# Patient Record
Sex: FEMALE | Race: WHITE | Employment: FULL TIME | URBAN - METROPOLITAN AREA
[De-identification: names, ages, dates, MRNs, and addresses within clinical notes are randomized per-mention and may not be internally consistent; named-entity substitution may affect disease eponyms.]

---

## 2017-06-13 ENCOUNTER — HOSPITAL ENCOUNTER (OUTPATIENT)
Dept: PREADMISSION TESTING | Age: 62
Discharge: HOME | End: 2017-06-13
Payer: COMMERCIAL

## 2017-06-13 VITALS
BODY MASS INDEX: 50.19 KG/M2 | OXYGEN SATURATION: 100 % | WEIGHT: 283.25 LBS | RESPIRATION RATE: 16 BRPM | DIASTOLIC BLOOD PRESSURE: 71 MMHG | SYSTOLIC BLOOD PRESSURE: 117 MMHG | TEMPERATURE: 97.4 F | HEIGHT: 63 IN | HEART RATE: 61 BPM

## 2017-06-13 LAB
ANION GAP SERPL CALCULATED.3IONS-SCNC: 18 MMOL/L (ref 9–17)
BUN BLDV-MCNC: 28 MG/DL (ref 8–23)
CHLORIDE BLD-SCNC: 102 MMOL/L (ref 98–107)
CO2: 23 MMOL/L (ref 20–31)
CREAT SERPL-MCNC: 0.79 MG/DL (ref 0.5–0.9)
GFR AFRICAN AMERICAN: >60 ML/MIN
GFR NON-AFRICAN AMERICAN: >60 ML/MIN
GFR SERPL CREATININE-BSD FRML MDRD: ABNORMAL ML/MIN/{1.73_M2}
GFR SERPL CREATININE-BSD FRML MDRD: ABNORMAL ML/MIN/{1.73_M2}
GLUCOSE BLD-MCNC: 90 MG/DL (ref 70–99)
HCT VFR BLD CALC: 43.8 % (ref 36–46)
HEMOGLOBIN: 14.5 G/DL (ref 12–16)
MCH RBC QN AUTO: 30.6 PG (ref 26–34)
MCHC RBC AUTO-ENTMCNC: 33.1 G/DL (ref 31–37)
MCV RBC AUTO: 92.6 FL (ref 80–100)
PDW BLD-RTO: 14.2 % (ref 12.5–15.4)
PLATELET # BLD: 171 K/UL (ref 140–450)
PMV BLD AUTO: 8.1 FL (ref 6–12)
POTASSIUM SERPL-SCNC: 4.6 MMOL/L (ref 3.7–5.3)
RBC # BLD: 4.73 M/UL (ref 4–5.2)
SODIUM BLD-SCNC: 143 MMOL/L (ref 135–144)
WBC # BLD: 3.6 K/UL (ref 3.5–11)

## 2017-06-13 PROCEDURE — 84520 ASSAY OF UREA NITROGEN: CPT

## 2017-06-13 PROCEDURE — 93005 ELECTROCARDIOGRAM TRACING: CPT

## 2017-06-13 PROCEDURE — 82565 ASSAY OF CREATININE: CPT

## 2017-06-13 PROCEDURE — 80051 ELECTROLYTE PANEL: CPT

## 2017-06-13 PROCEDURE — 36415 COLL VENOUS BLD VENIPUNCTURE: CPT

## 2017-06-13 PROCEDURE — 82947 ASSAY GLUCOSE BLOOD QUANT: CPT

## 2017-06-13 PROCEDURE — 85027 COMPLETE CBC AUTOMATED: CPT

## 2017-06-13 RX ORDER — METOPROLOL SUCCINATE 100 MG/1
1 TABLET, EXTENDED RELEASE ORAL 2 TIMES DAILY
COMMUNITY

## 2017-06-13 RX ORDER — SODIUM CHLORIDE, SODIUM LACTATE, POTASSIUM CHLORIDE, CALCIUM CHLORIDE 600; 310; 30; 20 MG/100ML; MG/100ML; MG/100ML; MG/100ML
1000 INJECTION, SOLUTION INTRAVENOUS CONTINUOUS
Status: CANCELLED | OUTPATIENT
Start: 2017-06-13

## 2017-06-14 LAB
EKG ATRIAL RATE: 58 BPM
EKG P AXIS: 39 DEGREES
EKG P-R INTERVAL: 206 MS
EKG Q-T INTERVAL: 440 MS
EKG QRS DURATION: 102 MS
EKG QTC CALCULATION (BAZETT): 431 MS
EKG R AXIS: 65 DEGREES
EKG T AXIS: 99 DEGREES
EKG VENTRICULAR RATE: 58 BPM

## 2017-06-14 RX ORDER — OXYMETAZOLINE HYDROCHLORIDE 0.05 G/100ML
2 SPRAY NASAL EVERY 10 MIN PRN
Status: CANCELLED | OUTPATIENT
Start: 2017-06-14

## 2017-06-23 ENCOUNTER — ANESTHESIA (OUTPATIENT)
Dept: OPERATING ROOM | Age: 62
End: 2017-06-23
Payer: COMMERCIAL

## 2017-06-23 ENCOUNTER — ANESTHESIA EVENT (OUTPATIENT)
Dept: OPERATING ROOM | Age: 62
End: 2017-06-23
Payer: COMMERCIAL

## 2017-06-23 ENCOUNTER — HOSPITAL ENCOUNTER (OUTPATIENT)
Age: 62
Setting detail: OUTPATIENT SURGERY
Discharge: HOME | End: 2017-06-23
Attending: OTOLARYNGOLOGY | Admitting: OTOLARYNGOLOGY
Payer: COMMERCIAL

## 2017-06-23 VITALS
SYSTOLIC BLOOD PRESSURE: 115 MMHG | RESPIRATION RATE: 16 BRPM | HEART RATE: 61 BPM | TEMPERATURE: 97.9 F | HEIGHT: 63 IN | BODY MASS INDEX: 50.14 KG/M2 | WEIGHT: 283 LBS | DIASTOLIC BLOOD PRESSURE: 72 MMHG | OXYGEN SATURATION: 99 %

## 2017-06-23 VITALS — DIASTOLIC BLOOD PRESSURE: 111 MMHG | OXYGEN SATURATION: 100 % | TEMPERATURE: 96.6 F | SYSTOLIC BLOOD PRESSURE: 181 MMHG

## 2017-06-23 LAB
ALLEN TEST: NORMAL
ANION GAP: 8 MMOL/L (ref 7–16)
FIO2: NORMAL
GFR NON-AFRICAN AMERICAN: 54 ML/MIN
GFR SERPL CREATININE-BSD FRML MDRD: >60 ML/MIN
GFR SERPL CREATININE-BSD FRML MDRD: ABNORMAL ML/MIN/{1.73_M2}
GLUCOSE BLD-MCNC: 101 MG/DL (ref 74–100)
HCO3 VENOUS: 27.3 MMOL/L (ref 22–29)
MODE: NORMAL
NEGATIVE BASE EXCESS, VEN: NORMAL (ref 0–2)
O2 DEVICE/FLOW/%: NORMAL
O2 SAT, VEN: 82 % (ref 60–85)
PATIENT TEMP: NORMAL
PCO2, VEN: 44.6 MM HG (ref 41–51)
PH VENOUS: 7.39 (ref 7.32–7.43)
PO2, VEN: 46.9 MM HG (ref 30–50)
POC CHLORIDE: 106 MMOL/L (ref 98–107)
POC CREATININE: 1.04 MG/DL (ref 0.51–1.19)
POC HEMATOCRIT: 48 % (ref 36–46)
POC HEMOGLOBIN: 16.4 G/DL (ref 12–16)
POC IONIZED CALCIUM: 1.23 MMOL/L (ref 1.15–1.33)
POC LACTIC ACID: 0.88 MMOL/L (ref 0.56–1.39)
POC PCO2 TEMP: NORMAL MM HG
POC PH TEMP: NORMAL
POC PO2 TEMP: NORMAL MM HG
POC POTASSIUM: 4.6 MMOL/L (ref 3.5–4.5)
POC SODIUM: 141 MMOL/L (ref 138–146)
POSITIVE BASE EXCESS, VEN: 2 (ref 0–3)
SAMPLE SITE: NORMAL
TOTAL CO2, VENOUS: 29 MMOL/L (ref 23–30)

## 2017-06-23 PROCEDURE — 82947 ASSAY GLUCOSE BLOOD QUANT: CPT

## 2017-06-23 PROCEDURE — 3700000001 HC ADD 15 MINUTES (ANESTHESIA): Performed by: OTOLARYNGOLOGY

## 2017-06-23 PROCEDURE — 3600000014 HC SURGERY LEVEL 4 ADDTL 15MIN: Performed by: OTOLARYNGOLOGY

## 2017-06-23 PROCEDURE — 2580000003 HC RX 258: Performed by: OTOLARYNGOLOGY

## 2017-06-23 PROCEDURE — 84295 ASSAY OF SERUM SODIUM: CPT

## 2017-06-23 PROCEDURE — 2500000003 HC RX 250 WO HCPCS: Performed by: OTOLARYNGOLOGY

## 2017-06-23 PROCEDURE — 3700000000 HC ANESTHESIA ATTENDED CARE: Performed by: OTOLARYNGOLOGY

## 2017-06-23 PROCEDURE — 6360000002 HC RX W HCPCS: Performed by: ANESTHESIOLOGY

## 2017-06-23 PROCEDURE — 82330 ASSAY OF CALCIUM: CPT

## 2017-06-23 PROCEDURE — 2580000003 HC RX 258: Performed by: NURSE ANESTHETIST, CERTIFIED REGISTERED

## 2017-06-23 PROCEDURE — 82803 BLOOD GASES ANY COMBINATION: CPT

## 2017-06-23 PROCEDURE — 6370000000 HC RX 637 (ALT 250 FOR IP): Performed by: OTOLARYNGOLOGY

## 2017-06-23 PROCEDURE — 7100000001 HC PACU RECOVERY - ADDTL 15 MIN: Performed by: OTOLARYNGOLOGY

## 2017-06-23 PROCEDURE — 2580000003 HC RX 258: Performed by: ANESTHESIOLOGY

## 2017-06-23 PROCEDURE — 84132 ASSAY OF SERUM POTASSIUM: CPT

## 2017-06-23 PROCEDURE — 2500000003 HC RX 250 WO HCPCS: Performed by: NURSE ANESTHETIST, CERTIFIED REGISTERED

## 2017-06-23 PROCEDURE — 3600000004 HC SURGERY LEVEL 4 BASE: Performed by: OTOLARYNGOLOGY

## 2017-06-23 PROCEDURE — 82565 ASSAY OF CREATININE: CPT

## 2017-06-23 PROCEDURE — 7100000011 HC PHASE II RECOVERY - ADDTL 15 MIN: Performed by: OTOLARYNGOLOGY

## 2017-06-23 PROCEDURE — 83605 ASSAY OF LACTIC ACID: CPT

## 2017-06-23 PROCEDURE — 6360000002 HC RX W HCPCS: Performed by: NURSE ANESTHETIST, CERTIFIED REGISTERED

## 2017-06-23 PROCEDURE — 82435 ASSAY OF BLOOD CHLORIDE: CPT

## 2017-06-23 PROCEDURE — 7100000000 HC PACU RECOVERY - FIRST 15 MIN: Performed by: OTOLARYNGOLOGY

## 2017-06-23 PROCEDURE — 7100000010 HC PHASE II RECOVERY - FIRST 15 MIN: Performed by: OTOLARYNGOLOGY

## 2017-06-23 PROCEDURE — 85014 HEMATOCRIT: CPT

## 2017-06-23 RX ORDER — HYDRALAZINE HYDROCHLORIDE 20 MG/ML
5 INJECTION INTRAMUSCULAR; INTRAVENOUS EVERY 10 MIN PRN
Status: DISCONTINUED | OUTPATIENT
Start: 2017-06-23 | End: 2017-06-23 | Stop reason: HOSPADM

## 2017-06-23 RX ORDER — OXYCODONE HYDROCHLORIDE AND ACETAMINOPHEN 5; 325 MG/1; MG/1
1 TABLET ORAL PRN
Status: DISCONTINUED | OUTPATIENT
Start: 2017-06-23 | End: 2017-06-23 | Stop reason: HOSPADM

## 2017-06-23 RX ORDER — ONDANSETRON 2 MG/ML
INJECTION INTRAMUSCULAR; INTRAVENOUS PRN
Status: DISCONTINUED | OUTPATIENT
Start: 2017-06-23 | End: 2017-06-23 | Stop reason: SDUPTHER

## 2017-06-23 RX ORDER — ROCURONIUM BROMIDE 10 MG/ML
INJECTION, SOLUTION INTRAVENOUS PRN
Status: DISCONTINUED | OUTPATIENT
Start: 2017-06-23 | End: 2017-06-23 | Stop reason: SDUPTHER

## 2017-06-23 RX ORDER — LIDOCAINE HYDROCHLORIDE AND EPINEPHRINE 10; 10 MG/ML; UG/ML
INJECTION, SOLUTION INFILTRATION; PERINEURAL PRN
Status: DISCONTINUED | OUTPATIENT
Start: 2017-06-23 | End: 2017-06-23 | Stop reason: HOSPADM

## 2017-06-23 RX ORDER — LABETALOL HYDROCHLORIDE 5 MG/ML
5 INJECTION, SOLUTION INTRAVENOUS EVERY 10 MIN PRN
Status: DISCONTINUED | OUTPATIENT
Start: 2017-06-23 | End: 2017-06-23 | Stop reason: HOSPADM

## 2017-06-23 RX ORDER — OXYMETAZOLINE HYDROCHLORIDE 0.05 G/100ML
2 SPRAY NASAL EVERY 10 MIN PRN
Status: COMPLETED | OUTPATIENT
Start: 2017-06-23 | End: 2017-06-23

## 2017-06-23 RX ORDER — FENTANYL CITRATE 50 UG/ML
INJECTION, SOLUTION INTRAMUSCULAR; INTRAVENOUS PRN
Status: DISCONTINUED | OUTPATIENT
Start: 2017-06-23 | End: 2017-06-23 | Stop reason: SDUPTHER

## 2017-06-23 RX ORDER — AMOXICILLIN 500 MG/1
500 CAPSULE ORAL 3 TIMES DAILY
Qty: 30 CAPSULE | Refills: 0 | Status: SHIPPED | OUTPATIENT
Start: 2017-06-23 | End: 2017-07-03

## 2017-06-23 RX ORDER — SODIUM CHLORIDE, SODIUM LACTATE, POTASSIUM CHLORIDE, CALCIUM CHLORIDE 600; 310; 30; 20 MG/100ML; MG/100ML; MG/100ML; MG/100ML
1000 INJECTION, SOLUTION INTRAVENOUS CONTINUOUS
Status: DISCONTINUED | OUTPATIENT
Start: 2017-06-23 | End: 2017-06-23 | Stop reason: HOSPADM

## 2017-06-23 RX ORDER — FENTANYL CITRATE 50 UG/ML
50 INJECTION, SOLUTION INTRAMUSCULAR; INTRAVENOUS EVERY 5 MIN PRN
Status: DISCONTINUED | OUTPATIENT
Start: 2017-06-23 | End: 2017-06-23 | Stop reason: HOSPADM

## 2017-06-23 RX ORDER — OXYCODONE HYDROCHLORIDE AND ACETAMINOPHEN 5; 325 MG/1; MG/1
1 TABLET ORAL EVERY 6 HOURS PRN
Qty: 30 TABLET | Refills: 0 | Status: SHIPPED | OUTPATIENT
Start: 2017-06-23 | End: 2017-06-30

## 2017-06-23 RX ORDER — DEXAMETHASONE SODIUM PHOSPHATE 10 MG/ML
INJECTION INTRAMUSCULAR; INTRAVENOUS PRN
Status: DISCONTINUED | OUTPATIENT
Start: 2017-06-23 | End: 2017-06-23 | Stop reason: SDUPTHER

## 2017-06-23 RX ORDER — SODIUM CHLORIDE, SODIUM LACTATE, POTASSIUM CHLORIDE, CALCIUM CHLORIDE 600; 310; 30; 20 MG/100ML; MG/100ML; MG/100ML; MG/100ML
INJECTION, SOLUTION INTRAVENOUS CONTINUOUS PRN
Status: DISCONTINUED | OUTPATIENT
Start: 2017-06-23 | End: 2017-06-23 | Stop reason: SDUPTHER

## 2017-06-23 RX ORDER — ONDANSETRON 2 MG/ML
4 INJECTION INTRAMUSCULAR; INTRAVENOUS
Status: DISCONTINUED | OUTPATIENT
Start: 2017-06-23 | End: 2017-06-23 | Stop reason: HOSPADM

## 2017-06-23 RX ORDER — OXYCODONE HYDROCHLORIDE AND ACETAMINOPHEN 5; 325 MG/1; MG/1
2 TABLET ORAL PRN
Status: DISCONTINUED | OUTPATIENT
Start: 2017-06-23 | End: 2017-06-23 | Stop reason: HOSPADM

## 2017-06-23 RX ORDER — LIDOCAINE HYDROCHLORIDE 10 MG/ML
INJECTION, SOLUTION EPIDURAL; INFILTRATION; INTRACAUDAL; PERINEURAL PRN
Status: DISCONTINUED | OUTPATIENT
Start: 2017-06-23 | End: 2017-06-23 | Stop reason: SDUPTHER

## 2017-06-23 RX ORDER — MAGNESIUM HYDROXIDE 1200 MG/15ML
LIQUID ORAL CONTINUOUS PRN
Status: DISCONTINUED | OUTPATIENT
Start: 2017-06-23 | End: 2017-06-23 | Stop reason: HOSPADM

## 2017-06-23 RX ORDER — FENTANYL CITRATE 50 UG/ML
25 INJECTION, SOLUTION INTRAMUSCULAR; INTRAVENOUS EVERY 5 MIN PRN
Status: DISCONTINUED | OUTPATIENT
Start: 2017-06-23 | End: 2017-06-23 | Stop reason: HOSPADM

## 2017-06-23 RX ORDER — PROPOFOL 10 MG/ML
INJECTION, EMULSION INTRAVENOUS PRN
Status: DISCONTINUED | OUTPATIENT
Start: 2017-06-23 | End: 2017-06-23 | Stop reason: SDUPTHER

## 2017-06-23 RX ORDER — MORPHINE SULFATE 2 MG/ML
2 INJECTION, SOLUTION INTRAMUSCULAR; INTRAVENOUS EVERY 5 MIN PRN
Status: DISCONTINUED | OUTPATIENT
Start: 2017-06-23 | End: 2017-06-23 | Stop reason: HOSPADM

## 2017-06-23 RX ORDER — GLYCOPYRROLATE 0.2 MG/ML
INJECTION INTRAMUSCULAR; INTRAVENOUS PRN
Status: DISCONTINUED | OUTPATIENT
Start: 2017-06-23 | End: 2017-06-23 | Stop reason: SDUPTHER

## 2017-06-23 RX ORDER — MEPERIDINE HYDROCHLORIDE 50 MG/ML
12.5 INJECTION INTRAMUSCULAR; INTRAVENOUS; SUBCUTANEOUS EVERY 5 MIN PRN
Status: DISCONTINUED | OUTPATIENT
Start: 2017-06-23 | End: 2017-06-23 | Stop reason: HOSPADM

## 2017-06-23 RX ORDER — DIPHENHYDRAMINE HYDROCHLORIDE 50 MG/ML
12.5 INJECTION INTRAMUSCULAR; INTRAVENOUS
Status: DISCONTINUED | OUTPATIENT
Start: 2017-06-23 | End: 2017-06-23 | Stop reason: HOSPADM

## 2017-06-23 RX ADMIN — ONDANSETRON 4 MG: 2 INJECTION INTRAMUSCULAR; INTRAVENOUS at 08:01

## 2017-06-23 RX ADMIN — SODIUM CHLORIDE, POTASSIUM CHLORIDE, SODIUM LACTATE AND CALCIUM CHLORIDE: 600; 310; 30; 20 INJECTION, SOLUTION INTRAVENOUS at 07:30

## 2017-06-23 RX ADMIN — PROPOFOL 200 MG: 10 INJECTION, EMULSION INTRAVENOUS at 07:33

## 2017-06-23 RX ADMIN — Medication 3 G: at 07:46

## 2017-06-23 RX ADMIN — FENTANYL CITRATE 25 MCG: 50 INJECTION, SOLUTION INTRAMUSCULAR; INTRAVENOUS at 08:50

## 2017-06-23 RX ADMIN — DEXAMETHASONE SODIUM PHOSPHATE 10 MG: 10 INJECTION INTRAMUSCULAR; INTRAVENOUS at 07:42

## 2017-06-23 RX ADMIN — SODIUM CHLORIDE, POTASSIUM CHLORIDE, SODIUM LACTATE AND CALCIUM CHLORIDE 1000 ML: 600; 310; 30; 20 INJECTION, SOLUTION INTRAVENOUS at 06:45

## 2017-06-23 RX ADMIN — OXYMETAZOLINE HYDROCHLORIDE 2 SPRAY: 5 SPRAY NASAL at 07:00

## 2017-06-23 RX ADMIN — LIDOCAINE HYDROCHLORIDE 50 MG: 10 INJECTION, SOLUTION EPIDURAL; INFILTRATION; INTRACAUDAL; PERINEURAL at 07:33

## 2017-06-23 RX ADMIN — ROCURONIUM BROMIDE 30 MG: 10 INJECTION, SOLUTION INTRAVENOUS at 07:33

## 2017-06-23 RX ADMIN — GLYCOPYRROLATE 0.2 MG: 0.2 INJECTION INTRAMUSCULAR; INTRAVENOUS at 07:44

## 2017-06-23 RX ADMIN — FENTANYL CITRATE 50 MCG: 50 INJECTION INTRAMUSCULAR; INTRAVENOUS at 07:33

## 2017-06-23 RX ADMIN — OXYMETAZOLINE HYDROCHLORIDE 2 SPRAY: 5 SPRAY NASAL at 07:08

## 2017-06-23 ASSESSMENT — ENCOUNTER SYMPTOMS: SHORTNESS OF BREATH: 1

## 2017-06-23 ASSESSMENT — PAIN SCALES - GENERAL
PAINLEVEL_OUTOF10: 4
PAINLEVEL_OUTOF10: 2
PAINLEVEL_OUTOF10: 0
PAINLEVEL_OUTOF10: 2
PAINLEVEL_OUTOF10: 2
PAINLEVEL_OUTOF10: 0
PAINLEVEL_OUTOF10: 0

## 2017-06-23 ASSESSMENT — PAIN DESCRIPTION - PAIN TYPE: TYPE: SURGICAL PAIN

## 2017-06-23 ASSESSMENT — PAIN DESCRIPTION - DESCRIPTORS: DESCRIPTORS: BURNING

## 2017-06-23 ASSESSMENT — PAIN DESCRIPTION - LOCATION: LOCATION: NOSE

## 2017-06-23 ASSESSMENT — PAIN - FUNCTIONAL ASSESSMENT: PAIN_FUNCTIONAL_ASSESSMENT: 0-10

## 2017-08-03 ENCOUNTER — HOSPITAL ENCOUNTER (OUTPATIENT)
Age: 62
Setting detail: SPECIMEN
Discharge: HOME | End: 2017-08-03
Payer: COMMERCIAL

## 2017-08-03 LAB
ALBUMIN SERPL-MCNC: 4 G/DL (ref 3.5–5.2)
ALBUMIN/GLOBULIN RATIO: 1.2 (ref 1–2.5)
ALP BLD-CCNC: 60 U/L (ref 35–104)
ALT SERPL-CCNC: 19 U/L (ref 5–33)
ANION GAP SERPL CALCULATED.3IONS-SCNC: 15 MMOL/L (ref 9–17)
AST SERPL-CCNC: 22 U/L
BILIRUB SERPL-MCNC: 0.61 MG/DL (ref 0.3–1.2)
BUN BLDV-MCNC: 19 MG/DL (ref 8–23)
BUN/CREAT BLD: NORMAL (ref 9–20)
CALCIUM SERPL-MCNC: 9.5 MG/DL (ref 8.6–10.4)
CHLORIDE BLD-SCNC: 101 MMOL/L (ref 98–107)
CHOLESTEROL/HDL RATIO: 2.9
CHOLESTEROL: 140 MG/DL
CO2: 24 MMOL/L (ref 20–31)
CREAT SERPL-MCNC: 0.76 MG/DL (ref 0.5–0.9)
GFR AFRICAN AMERICAN: >60 ML/MIN
GFR NON-AFRICAN AMERICAN: >60 ML/MIN
GFR SERPL CREATININE-BSD FRML MDRD: NORMAL ML/MIN/{1.73_M2}
GFR SERPL CREATININE-BSD FRML MDRD: NORMAL ML/MIN/{1.73_M2}
GLUCOSE BLD-MCNC: 96 MG/DL (ref 70–99)
HCT VFR BLD CALC: 43.9 % (ref 36–46)
HDLC SERPL-MCNC: 48 MG/DL
HEMOGLOBIN: 14.7 G/DL (ref 12–16)
LDL CHOLESTEROL: 75 MG/DL (ref 0–130)
MCH RBC QN AUTO: 30.6 PG (ref 26–34)
MCHC RBC AUTO-ENTMCNC: 33.5 G/DL (ref 31–37)
MCV RBC AUTO: 91.4 FL (ref 80–100)
PDW BLD-RTO: 14.1 % (ref 12.5–15.4)
PLATELET # BLD: 184 K/UL (ref 140–450)
PMV BLD AUTO: 8.5 FL (ref 6–12)
POTASSIUM SERPL-SCNC: 4.8 MMOL/L (ref 3.7–5.3)
RBC # BLD: 4.8 M/UL (ref 4–5.2)
SODIUM BLD-SCNC: 140 MMOL/L (ref 135–144)
TOTAL PROTEIN: 7.4 G/DL (ref 6.4–8.3)
TRIGL SERPL-MCNC: 86 MG/DL
TSH SERPL DL<=0.05 MIU/L-ACNC: 2.79 MIU/L (ref 0.3–5)
VITAMIN D 25-HYDROXY: 41.2 NG/ML (ref 30–100)
VLDLC SERPL CALC-MCNC: NORMAL MG/DL (ref 1–30)
WBC # BLD: 3.5 K/UL (ref 3.5–11)

## 2017-11-09 ENCOUNTER — OFFICE VISIT (OUTPATIENT)
Dept: PODIATRY | Age: 62
End: 2017-11-09
Payer: COMMERCIAL

## 2017-11-09 VITALS — BODY MASS INDEX: 42.44 KG/M2 | HEIGHT: 68 IN | WEIGHT: 280 LBS

## 2017-11-09 DIAGNOSIS — D23.71 BENIGN NEOPLASM OF SKIN OF LOWER LIMB, INCLUDING HIP, RIGHT: ICD-10-CM

## 2017-11-09 DIAGNOSIS — M79.604 BILATERAL LOWER EXTREMITY PAIN: Primary | ICD-10-CM

## 2017-11-09 DIAGNOSIS — M79.605 BILATERAL LOWER EXTREMITY PAIN: Primary | ICD-10-CM

## 2017-11-09 DIAGNOSIS — B35.1 DERMATOPHYTOSIS OF NAIL: ICD-10-CM

## 2017-11-09 PROBLEM — R09.89 BRUIT OF RIGHT CAROTID ARTERY: Status: ACTIVE | Noted: 2017-02-11

## 2017-11-09 PROBLEM — I73.9 PAD (PERIPHERAL ARTERY DISEASE) (HCC): Status: ACTIVE | Noted: 2017-02-11

## 2017-11-09 PROCEDURE — 1036F TOBACCO NON-USER: CPT | Performed by: PODIATRIST

## 2017-11-09 PROCEDURE — G8484 FLU IMMUNIZE NO ADMIN: HCPCS | Performed by: PODIATRIST

## 2017-11-09 PROCEDURE — 11721 DEBRIDE NAIL 6 OR MORE: CPT | Performed by: PODIATRIST

## 2017-11-09 PROCEDURE — G8417 CALC BMI ABV UP PARAM F/U: HCPCS | Performed by: PODIATRIST

## 2017-11-09 PROCEDURE — 3014F SCREEN MAMMO DOC REV: CPT | Performed by: PODIATRIST

## 2017-11-09 PROCEDURE — G8427 DOCREV CUR MEDS BY ELIG CLIN: HCPCS | Performed by: PODIATRIST

## 2017-11-09 PROCEDURE — 17110 DESTRUCTION B9 LES UP TO 14: CPT | Performed by: PODIATRIST

## 2017-11-09 PROCEDURE — 3017F COLORECTAL CA SCREEN DOC REV: CPT | Performed by: PODIATRIST

## 2017-11-09 NOTE — PROGRESS NOTES
500 mg by mouth 2 times daily       potassium chloride SA (K-DUR;KLOR-CON M) 20 MEQ tablet Take 1 tablet by mouth daily. 60 tablet 3    furosemide (LASIX) 40 MG tablet Take 1 tablet by mouth 2 times daily. (Patient taking differently: Take 20 mg by mouth 2 times daily ) 60 tablet 3    Cholecalciferol (VITAMIN D3) 2000 UNITS CAPS Take 1 tablet by mouth daily.  FISH OIL Take 1,000 mg by mouth daily       COENZYME Q10 Take 50 mg by mouth daily       aspirin 81 MG EC tablet Take 81 mg by mouth daily STOPPED TAKING 06/02/2017 FOR SURGERY       No current facility-administered medications on file prior to visit. Review of Systems  Objective:  General: AAO x 3 in NAD. Derm  Toenail Description  Sites of Onychomycosis Involvement (Check affected area)  [x] [x] [x] [x] [x] [x] [x] [x] [x] [x]  5 4 3 2 1 1 2 3 4 5                          Right                                        Left    Thickness  [x] [x] [x] [x] [x] [x] [x] [x] [x] [x]  5 4 3 2 1 1 2 3 4 5                         Right                                        Left    Dystrophic Changes   [x] [x] [x] [x] [x] [x] [x] [x] [x] [x]  5 4 3 2 1 1 2 3 4 5                         Right                                        Left    Color  [x] [x] [x] [x] [x] [x] [x] [x] [x] [x]  5 4 3 2 1 1 2 3 4 5                          Right                                        Left    Incurvation/Ingrowin   [] [] [] [] [] [] [] [] [] []  5 4 3 2 1 1 2 3 4 5                         Right                                        Left    Inflammation/Pain   [x] [x] [x] [x] [x] [x] [x] [x] [x] [x]  5 4 3  2 1 1 2 3 4 5                         Right                                        Left       Nails are painful 1-10 with direct palpation. Dermatologic Exam:  Skin lesion present to the right plantar foot with a central core and petchaie noted to the right foot at the central core. .   Skin No rashes or nodules noted. .       Musculoskeletal:     1st MPJ

## 2017-12-05 ENCOUNTER — HOSPITAL ENCOUNTER (OUTPATIENT)
Dept: VASCULAR LAB | Age: 62
Discharge: HOME | End: 2017-12-05
Payer: COMMERCIAL

## 2017-12-05 PROCEDURE — 93971 EXTREMITY STUDY: CPT

## 2018-01-15 ENCOUNTER — HOSPITAL ENCOUNTER (OUTPATIENT)
Age: 63
Setting detail: SPECIMEN
Discharge: HOME | End: 2018-01-15
Payer: COMMERCIAL

## 2018-01-15 ENCOUNTER — OFFICE VISIT (OUTPATIENT)
Dept: OBGYN CLINIC | Age: 63
End: 2018-01-15
Payer: COMMERCIAL

## 2018-01-15 VITALS
HEIGHT: 68 IN | WEIGHT: 280 LBS | DIASTOLIC BLOOD PRESSURE: 70 MMHG | SYSTOLIC BLOOD PRESSURE: 120 MMHG | BODY MASS INDEX: 42.44 KG/M2

## 2018-01-15 DIAGNOSIS — Z01.419 ENCOUNTER FOR ROUTINE GYNECOLOGICAL EXAMINATION WITH PAPANICOLAOU SMEAR OF CERVIX: Primary | ICD-10-CM

## 2018-01-15 DIAGNOSIS — Z12.31 ENCOUNTER FOR SCREENING MAMMOGRAM FOR MALIGNANT NEOPLASM OF BREAST: ICD-10-CM

## 2018-01-15 PROCEDURE — 99396 PREV VISIT EST AGE 40-64: CPT | Performed by: OBSTETRICS & GYNECOLOGY

## 2018-01-15 ASSESSMENT — ENCOUNTER SYMPTOMS
COUGH: 0
NAUSEA: 0
VOMITING: 0
BLURRED VISION: 0
ABDOMINAL PAIN: 0
WHEEZING: 0
ORTHOPNEA: 0
CONSTIPATION: 0
DOUBLE VISION: 0
DIARRHEA: 0
HEARTBURN: 0

## 2018-01-15 NOTE — PROGRESS NOTES
potassium chloride SA (K-DUR;KLOR-CON M) 20 MEQ tablet Take 1 tablet by mouth daily. 60 tablet 3    furosemide (LASIX) 40 MG tablet Take 1 tablet by mouth 2 times daily. (Patient taking differently: Take 20 mg by mouth 2 times daily ) 60 tablet 3    Cholecalciferol (VITAMIN D3) 2000 UNITS CAPS Take 1 tablet by mouth daily.  FISH OIL Take 1,000 mg by mouth daily       COENZYME Q10 Take 50 mg by mouth daily       aspirin 81 MG EC tablet Take 81 mg by mouth daily STOPPED TAKING 2017 FOR SURGERY       No current facility-administered medications for this visit. Allergies:  Norvasc [amlodipine]; Lisinopril; Sulfa antibiotics; and Sulfasalazine    Gynecologic History:  No LMP recorded. Patient is postmenopausal.  Sexually Active: No  STD History: No      Obstetric History       T2      L2     SAB0   TAB0   Ectopic0   Molar0   Multiple0   Live Births2      ______________________________________________________________________  REVIEW OF SYSTEMS:        Review of Systems   Constitutional: Negative for chills, fever and weight loss. HENT: Negative for hearing loss. Eyes: Negative for blurred vision and double vision. Respiratory: Negative for cough and wheezing. Cardiovascular: Negative for chest pain, palpitations and orthopnea. Gastrointestinal: Negative for abdominal pain, constipation, diarrhea, heartburn, nausea and vomiting. Genitourinary: Negative for dysuria, frequency and urgency. Musculoskeletal: Negative for joint pain and myalgias. Skin: Negative for rash. Neurological: Negative for dizziness, tingling, focal weakness, weakness and headaches. Endo/Heme/Allergies: Does not bruise/bleed easily. Psychiatric/Behavioral: Negative for depression, substance abuse and suicidal ideas. The patient does not have insomnia.         /70   Ht 5' 8\" (1.727 m)   Wt 280 lb (127 kg)   BMI 42.57 kg/m²

## 2018-01-16 LAB
HPV SAMPLE: NORMAL
HPV SOURCE: NORMAL
HPV, GENOTYPE 16: NOT DETECTED
HPV, GENOTYPE 18: NOT DETECTED
HPV, HIGH RISK OTHER: NOT DETECTED
HPV, INTERPRETATION: NORMAL

## 2018-01-18 DIAGNOSIS — Z12.11 ENCOUNTER FOR SCREENING COLONOSCOPY: Primary | ICD-10-CM

## 2018-01-18 LAB
CONTROL: NORMAL
HEMOCCULT STL QL: NEGATIVE

## 2018-01-18 PROCEDURE — 82274 ASSAY TEST FOR BLOOD FECAL: CPT | Performed by: OBSTETRICS & GYNECOLOGY

## 2018-01-24 LAB — CYTOLOGY REPORT: NORMAL

## 2018-01-25 ENCOUNTER — OFFICE VISIT (OUTPATIENT)
Dept: PODIATRY | Age: 63
End: 2018-01-25
Payer: COMMERCIAL

## 2018-01-25 VITALS — WEIGHT: 280 LBS | BODY MASS INDEX: 42.44 KG/M2 | HEIGHT: 68 IN

## 2018-01-25 DIAGNOSIS — D23.71 BENIGN NEOPLASM OF SKIN OF LOWER LIMB, INCLUDING HIP, RIGHT: ICD-10-CM

## 2018-01-25 DIAGNOSIS — M79.605 BILATERAL LOWER EXTREMITY PAIN: Primary | ICD-10-CM

## 2018-01-25 DIAGNOSIS — M79.604 BILATERAL LOWER EXTREMITY PAIN: Primary | ICD-10-CM

## 2018-01-25 DIAGNOSIS — B35.1 DERMATOPHYTOSIS OF NAIL: ICD-10-CM

## 2018-01-25 PROCEDURE — 3014F SCREEN MAMMO DOC REV: CPT | Performed by: PODIATRIST

## 2018-01-25 PROCEDURE — 1036F TOBACCO NON-USER: CPT | Performed by: PODIATRIST

## 2018-01-25 PROCEDURE — 11721 DEBRIDE NAIL 6 OR MORE: CPT | Performed by: PODIATRIST

## 2018-01-25 PROCEDURE — G8427 DOCREV CUR MEDS BY ELIG CLIN: HCPCS | Performed by: PODIATRIST

## 2018-01-25 PROCEDURE — G8484 FLU IMMUNIZE NO ADMIN: HCPCS | Performed by: PODIATRIST

## 2018-01-25 PROCEDURE — 17110 DESTRUCTION B9 LES UP TO 14: CPT | Performed by: PODIATRIST

## 2018-01-25 PROCEDURE — G8417 CALC BMI ABV UP PARAM F/U: HCPCS | Performed by: PODIATRIST

## 2018-01-25 PROCEDURE — 3017F COLORECTAL CA SCREEN DOC REV: CPT | Performed by: PODIATRIST

## 2018-01-25 NOTE — PROGRESS NOTES
[x] [x] [x] [x] [x] [x] [x] [x] [x] [x]  5 4 3  2 1 1 2 3 4 5                         Right                                        Left       Nails are painful 1-10 with direct palpation. Dermatologic Exam:  Skin lesion/ulceration Absent . Skin No rashes or nodules noted. .       Musculoskeletal:     1st MPJ ROM decreased, Bilateral.  Muscle strength 5/5, Bilateral.  Pain present upon palpation of toenails 1-5, Bilateral. decreased medial longitudinal arch, Bilateral.  Ankle ROM decreased,Bilateral.    Dorsally contracted digits present digits 2, Bilateral.     Vascular: DP and PT pulses palpable 1/4, Bilateral.  CFT <5 seconds, Bilateral.  Hair growth absent to the level of the digits, Bilateral.  Edema present, Bilateral.  Varicosities absent, Bilateral. Erythema absent, Bilateral    Integument: Warm, dry, supple, Bilateral.  Open lesion absent, Bilateral.  Interdigital maceration absent to web spaces 4, Bilateral.  Nails 1-5 left and 1-5 right thickened > 3.0 mm, dystrophic and crumbly, discolored with subungual debris. Fissures absent, Bilateral.   Neurological:  Sensation present to light touch to level of digits, Bilateral.    Assessment:  65 y.o. female with:   1. Bilateral lower extremity pain  30427 - [place] DEBRIDEMENT OF NAILS, 6 OR MORE   2. Dermatophytosis of nail  41566 - [place] DEBRIDEMENT OF NAILS, 6 OR MORE   3. Benign neoplasm of skin of lower limb, including hip, right  90948 - [place] DESTRUCTION BENIGN LESIONS UP TO 14         Plan:   Pt was evaluated and examined. Patient was given personalized discharge instructions. Nails 1-10 were debrided in length and thickness sharply with a nail nipper and  without incident. Pt will follow up in 9 weeks or sooner if any problems arise. Diagnosis was discussed with the pt and all of their questions were answered in detail. Proper foot hygiene and care was discussed with the pt.  Patient to check feet daily and contact the office with any

## 2018-02-07 ENCOUNTER — HOSPITAL ENCOUNTER (OUTPATIENT)
Age: 63
Setting detail: SPECIMEN
Discharge: HOME | End: 2018-02-07
Payer: COMMERCIAL

## 2018-02-07 LAB
ABSOLUTE EOS #: 0.05 K/UL (ref 0–0.44)
ABSOLUTE IMMATURE GRANULOCYTE: <0.03 K/UL (ref 0–0.3)
ABSOLUTE LYMPH #: 1.63 K/UL (ref 1.1–3.7)
ABSOLUTE MONO #: 0.48 K/UL (ref 0.1–1.2)
ANION GAP SERPL CALCULATED.3IONS-SCNC: 11 MMOL/L (ref 9–17)
BASOPHILS # BLD: 1 % (ref 0–2)
BASOPHILS ABSOLUTE: <0.03 K/UL (ref 0–0.2)
BUN BLDV-MCNC: 27 MG/DL (ref 8–23)
BUN/CREAT BLD: ABNORMAL (ref 9–20)
CALCIUM SERPL-MCNC: 10.1 MG/DL (ref 8.6–10.4)
CHLORIDE BLD-SCNC: 92 MMOL/L (ref 98–107)
CO2: 29 MMOL/L (ref 20–31)
CREAT SERPL-MCNC: 0.96 MG/DL (ref 0.5–0.9)
DIFFERENTIAL TYPE: ABNORMAL
EOSINOPHILS RELATIVE PERCENT: 1 % (ref 1–4)
GFR AFRICAN AMERICAN: >60 ML/MIN
GFR NON-AFRICAN AMERICAN: 59 ML/MIN
GFR SERPL CREATININE-BSD FRML MDRD: ABNORMAL ML/MIN/{1.73_M2}
GFR SERPL CREATININE-BSD FRML MDRD: ABNORMAL ML/MIN/{1.73_M2}
GLUCOSE BLD-MCNC: 83 MG/DL (ref 70–99)
HCT VFR BLD CALC: 48.4 % (ref 36.3–47.1)
HEMOGLOBIN: 15.2 G/DL (ref 11.9–15.1)
IMMATURE GRANULOCYTES: 0 %
LYMPHOCYTES # BLD: 47 % (ref 24–43)
MCH RBC QN AUTO: 30.2 PG (ref 25.2–33.5)
MCHC RBC AUTO-ENTMCNC: 31.4 G/DL (ref 28.4–34.8)
MCV RBC AUTO: 96 FL (ref 82.6–102.9)
MONOCYTES # BLD: 14 % (ref 3–12)
NRBC AUTOMATED: 0 PER 100 WBC
PDW BLD-RTO: 12.8 % (ref 11.8–14.4)
PLATELET # BLD: 220 K/UL (ref 138–453)
PLATELET ESTIMATE: ABNORMAL
PMV BLD AUTO: 10.3 FL (ref 8.1–13.5)
POTASSIUM SERPL-SCNC: 5.5 MMOL/L (ref 3.7–5.3)
RBC # BLD: 5.04 M/UL (ref 3.95–5.11)
RBC # BLD: ABNORMAL 10*6/UL
SEG NEUTROPHILS: 37 % (ref 36–65)
SEGMENTED NEUTROPHILS ABSOLUTE COUNT: 1.28 K/UL (ref 1.5–8.1)
SODIUM BLD-SCNC: 132 MMOL/L (ref 135–144)
WBC # BLD: 3.5 K/UL (ref 3.5–11.3)
WBC # BLD: ABNORMAL 10*3/UL

## 2018-02-28 ENCOUNTER — OFFICE VISIT (OUTPATIENT)
Dept: PODIATRY | Age: 63
End: 2018-02-28
Payer: COMMERCIAL

## 2018-02-28 VITALS — RESPIRATION RATE: 16 BRPM | WEIGHT: 280 LBS | HEIGHT: 62 IN | HEART RATE: 72 BPM | BODY MASS INDEX: 51.53 KG/M2

## 2018-02-28 DIAGNOSIS — I73.9 PVD (PERIPHERAL VASCULAR DISEASE) (HCC): ICD-10-CM

## 2018-02-28 DIAGNOSIS — M79.671 PAIN IN RIGHT FOOT: ICD-10-CM

## 2018-02-28 DIAGNOSIS — D23.71 BENIGN NEOPLASM OF SKIN OF LOWER LIMB, INCLUDING HIP, RIGHT: Primary | ICD-10-CM

## 2018-02-28 PROCEDURE — G8427 DOCREV CUR MEDS BY ELIG CLIN: HCPCS | Performed by: PODIATRIST

## 2018-02-28 PROCEDURE — 3017F COLORECTAL CA SCREEN DOC REV: CPT | Performed by: PODIATRIST

## 2018-02-28 PROCEDURE — 99213 OFFICE O/P EST LOW 20 MIN: CPT | Performed by: PODIATRIST

## 2018-02-28 PROCEDURE — G8484 FLU IMMUNIZE NO ADMIN: HCPCS | Performed by: PODIATRIST

## 2018-02-28 PROCEDURE — 17110 DESTRUCTION B9 LES UP TO 14: CPT | Performed by: PODIATRIST

## 2018-02-28 PROCEDURE — 1036F TOBACCO NON-USER: CPT | Performed by: PODIATRIST

## 2018-02-28 PROCEDURE — G8417 CALC BMI ABV UP PARAM F/U: HCPCS | Performed by: PODIATRIST

## 2018-02-28 PROCEDURE — 3014F SCREEN MAMMO DOC REV: CPT | Performed by: PODIATRIST

## 2018-03-05 NOTE — PROGRESS NOTES
[facility] Podiatry  Progress Note    Ritu Morris is a 59 y.o. female with the chief complaint of painful lesions on her   feet. . They have been present for 2 week(s) duration. The lesions are present on the right foot. The patient has 1 lesion that is deep seated and has a painful core. Treatment has included padding it and changing shoe gear. Review of Systems    Lower extremity physical exam:    Vascular: Pedal pulses are palpable. No edema or varicosities. Neurology: Sensation is normal. Reflexes are within normal limits. Orthopedic: Joint range of motion is normal. Muscle strength is normal.Osseous prominence noted in association with hyperkeratosis. Decrease fat pad noted. Pain to palpation to prominence and lesion. Dermatology: Inspection of skin and all tissues are normal. Nails are normal.   Lesion present on the lateral aspect of the right 5th toe. Pain on compression. Varus deformity of the right 5th toe    Assessment:  1. Benign neoplasm of skin of lower limb, including hip, right    2. PVD (peripheral vascular disease) (HCC)    3. Pain in right foot        Plan:  The lesion was partially debrided and silver nitrate was applied with an apperature pad under occlusion. The patient will leave in place for 24-48 hours and than remove. The patient tolerated the procedure well and without complication.    Pt will follow up in 6 weeks     Electronically signed by Electronically signed by Lanette Salcedo DPM on 3/5/2018 at 1:40 PM

## 2018-03-24 ENCOUNTER — HOSPITAL ENCOUNTER (OUTPATIENT)
Age: 63
Discharge: HOME | End: 2018-03-24
Payer: COMMERCIAL

## 2018-03-24 LAB
ALBUMIN SERPL-MCNC: 4 G/DL (ref 3.5–5.2)
ALBUMIN/GLOBULIN RATIO: ABNORMAL (ref 1–2.5)
ALP BLD-CCNC: 61 U/L (ref 35–104)
ALT SERPL-CCNC: 22 U/L (ref 5–33)
ANION GAP SERPL CALCULATED.3IONS-SCNC: 12 MMOL/L (ref 9–17)
AST SERPL-CCNC: 24 U/L
BILIRUB SERPL-MCNC: 0.67 MG/DL (ref 0.3–1.2)
BUN BLDV-MCNC: 26 MG/DL (ref 8–23)
BUN/CREAT BLD: 38 (ref 9–20)
CALCIUM SERPL-MCNC: 9.9 MG/DL (ref 8.6–10.4)
CHLORIDE BLD-SCNC: 101 MMOL/L (ref 98–107)
CHOLESTEROL, FASTING: 129 MG/DL
CHOLESTEROL/HDL RATIO: 3.1
CO2: 25 MMOL/L (ref 20–31)
CREAT SERPL-MCNC: 0.68 MG/DL (ref 0.5–0.9)
GFR AFRICAN AMERICAN: >60 ML/MIN
GFR NON-AFRICAN AMERICAN: >60 ML/MIN
GFR SERPL CREATININE-BSD FRML MDRD: ABNORMAL ML/MIN/{1.73_M2}
GFR SERPL CREATININE-BSD FRML MDRD: ABNORMAL ML/MIN/{1.73_M2}
GLUCOSE FASTING: 99 MG/DL (ref 70–99)
HDLC SERPL-MCNC: 41 MG/DL
LDL CHOLESTEROL: 74 MG/DL (ref 0–130)
POTASSIUM SERPL-SCNC: 4.4 MMOL/L (ref 3.7–5.3)
SODIUM BLD-SCNC: 138 MMOL/L (ref 135–144)
TOTAL CK: 66 U/L (ref 26–192)
TOTAL PROTEIN: 7.4 G/DL (ref 6.4–8.3)
TRIGLYCERIDE, FASTING: 70 MG/DL
VLDLC SERPL CALC-MCNC: NORMAL MG/DL (ref 1–30)

## 2018-03-24 PROCEDURE — 80061 LIPID PANEL: CPT

## 2018-03-24 PROCEDURE — 80053 COMPREHEN METABOLIC PANEL: CPT

## 2018-03-24 PROCEDURE — 82550 ASSAY OF CK (CPK): CPT

## 2018-03-24 PROCEDURE — 36415 COLL VENOUS BLD VENIPUNCTURE: CPT

## 2018-04-26 ENCOUNTER — OFFICE VISIT (OUTPATIENT)
Dept: PODIATRY | Age: 63
End: 2018-04-26
Payer: COMMERCIAL

## 2018-04-26 VITALS — HEIGHT: 62 IN | WEIGHT: 280 LBS | BODY MASS INDEX: 51.53 KG/M2

## 2018-04-26 DIAGNOSIS — D23.71 BENIGN NEOPLASM OF SKIN OF LOWER LIMB, INCLUDING HIP, RIGHT: ICD-10-CM

## 2018-04-26 DIAGNOSIS — M79.671 PAIN IN BOTH FEET: Primary | ICD-10-CM

## 2018-04-26 DIAGNOSIS — B35.1 DERMATOPHYTOSIS OF NAIL: ICD-10-CM

## 2018-04-26 DIAGNOSIS — M79.672 PAIN IN BOTH FEET: Primary | ICD-10-CM

## 2018-04-26 PROCEDURE — 11721 DEBRIDE NAIL 6 OR MORE: CPT | Performed by: PODIATRIST

## 2018-04-26 PROCEDURE — G8427 DOCREV CUR MEDS BY ELIG CLIN: HCPCS | Performed by: PODIATRIST

## 2018-04-26 PROCEDURE — 1036F TOBACCO NON-USER: CPT | Performed by: PODIATRIST

## 2018-04-26 PROCEDURE — 3017F COLORECTAL CA SCREEN DOC REV: CPT | Performed by: PODIATRIST

## 2018-04-26 PROCEDURE — 17110 DESTRUCTION B9 LES UP TO 14: CPT | Performed by: PODIATRIST

## 2018-04-26 PROCEDURE — G8417 CALC BMI ABV UP PARAM F/U: HCPCS | Performed by: PODIATRIST

## 2018-06-21 ENCOUNTER — OFFICE VISIT (OUTPATIENT)
Dept: PODIATRY | Age: 63
End: 2018-06-21
Payer: COMMERCIAL

## 2018-06-21 VITALS — BODY MASS INDEX: 51.53 KG/M2 | WEIGHT: 280 LBS | RESPIRATION RATE: 16 BRPM | HEIGHT: 62 IN | HEART RATE: 72 BPM

## 2018-06-21 DIAGNOSIS — M79.672 PAIN IN BOTH FEET: ICD-10-CM

## 2018-06-21 DIAGNOSIS — M79.671 PAIN IN BOTH FEET: ICD-10-CM

## 2018-06-21 DIAGNOSIS — D23.71 BENIGN NEOPLASM OF SKIN OF LOWER LIMB, INCLUDING HIP, RIGHT: Primary | ICD-10-CM

## 2018-06-21 DIAGNOSIS — B35.1 DERMATOPHYTOSIS OF NAIL: ICD-10-CM

## 2018-06-21 PROCEDURE — 17110 DESTRUCTION B9 LES UP TO 14: CPT | Performed by: PODIATRIST

## 2018-06-21 PROCEDURE — 1036F TOBACCO NON-USER: CPT | Performed by: PODIATRIST

## 2018-06-21 PROCEDURE — G8417 CALC BMI ABV UP PARAM F/U: HCPCS | Performed by: PODIATRIST

## 2018-06-21 PROCEDURE — 11721 DEBRIDE NAIL 6 OR MORE: CPT | Performed by: PODIATRIST

## 2018-06-21 PROCEDURE — G8427 DOCREV CUR MEDS BY ELIG CLIN: HCPCS | Performed by: PODIATRIST

## 2018-06-21 PROCEDURE — 3017F COLORECTAL CA SCREEN DOC REV: CPT | Performed by: PODIATRIST

## 2018-08-07 ENCOUNTER — OFFICE VISIT (OUTPATIENT)
Dept: PODIATRY | Age: 63
End: 2018-08-07
Payer: COMMERCIAL

## 2018-08-07 VITALS — WEIGHT: 280 LBS | HEIGHT: 62 IN | BODY MASS INDEX: 51.53 KG/M2

## 2018-08-07 DIAGNOSIS — B35.1 DERMATOPHYTOSIS OF NAIL: ICD-10-CM

## 2018-08-07 DIAGNOSIS — D23.71 BENIGN NEOPLASM OF SKIN OF LOWER LIMB, INCLUDING HIP, RIGHT: ICD-10-CM

## 2018-08-07 DIAGNOSIS — M79.672 PAIN IN BOTH FEET: Primary | ICD-10-CM

## 2018-08-07 DIAGNOSIS — M79.671 PAIN IN BOTH FEET: Primary | ICD-10-CM

## 2018-08-07 DIAGNOSIS — I73.9 PVD (PERIPHERAL VASCULAR DISEASE) (HCC): ICD-10-CM

## 2018-08-07 PROCEDURE — 17110 DESTRUCTION B9 LES UP TO 14: CPT | Performed by: PODIATRIST

## 2018-08-07 PROCEDURE — 11721 DEBRIDE NAIL 6 OR MORE: CPT | Performed by: PODIATRIST

## 2018-08-07 PROCEDURE — G8417 CALC BMI ABV UP PARAM F/U: HCPCS | Performed by: PODIATRIST

## 2018-08-07 PROCEDURE — G8427 DOCREV CUR MEDS BY ELIG CLIN: HCPCS | Performed by: PODIATRIST

## 2018-08-07 PROCEDURE — 3017F COLORECTAL CA SCREEN DOC REV: CPT | Performed by: PODIATRIST

## 2018-08-07 PROCEDURE — 1036F TOBACCO NON-USER: CPT | Performed by: PODIATRIST

## 2018-08-07 NOTE — PROGRESS NOTES
Reactions    Norvasc [Amlodipine] Other (See Comments)     Open sores on legs    Lisinopril Other (See Comments)     COUGH  COUGH  COUGH      Sulfa Antibiotics Itching     RED EYES    Sulfasalazine Itching     RED EYES     Current Outpatient Prescriptions on File Prior to Visit   Medication Sig Dispense Refill    metoprolol succinate (TOPROL XL) 100 MG extended release tablet Take 1 tablet by mouth 2 times daily      spironolactone (ALDACTONE) 50 MG tablet Take 50 mg by mouth daily      calcium carbonate (CALCIUM 600) 600 MG TABS tablet Take 1 tablet by mouth 2 times daily      Multiple Vitamins-Minerals (MULTIVITAMIN ADULT PO) Take by mouth daily      atorvastatin (LIPITOR) 20 MG tablet Take 20 mg by mouth daily      vitamin E 1000 UNITS capsule Take 400 Units by mouth 2 times daily       amiodarone (PACERONE) 100 MG tablet Take 100 mg by mouth daily       Ascorbic Acid (VITAMIN C) 250 MG tablet Take 500 mg by mouth 2 times daily       potassium chloride SA (K-DUR;KLOR-CON M) 20 MEQ tablet Take 1 tablet by mouth daily. 60 tablet 3    furosemide (LASIX) 40 MG tablet Take 1 tablet by mouth 2 times daily. (Patient taking differently: Take 20 mg by mouth 2 times daily ) 60 tablet 3    Cholecalciferol (VITAMIN D3) 2000 UNITS CAPS Take 1 tablet by mouth daily.  FISH OIL Take 1,000 mg by mouth daily       COENZYME Q10 Take 50 mg by mouth daily       aspirin 81 MG EC tablet Take 81 mg by mouth daily STOPPED TAKING 06/02/2017 FOR SURGERY       No current facility-administered medications on file prior to visit. Review of Systems  Review of Systems - History obtained from chart review and the patient  General ROS: negative for - chills, fatigue, fever, night sweats or weight gain  Constitutional: Negative for chills, diaphoresis, fatigue, fever and unexpected weight change. Musculoskeletal: Positive for arthralgias, gait problem and joint swelling.   Neurological ROS: negative for - behavioral changes, confusion, headaches or seizures. Negative for weakness and numbness. Dermatological ROS: negative for - mole changes, rash  Cardiovascular: Negative for leg swelling. Gastrointestinal: Negative for constipation, diarrhea, nausea and vomiting. Objective:  General: AAO x 3 in NAD. Derm  Toenail Description  Sites of Onychomycosis Involvement (Check affected area)  [x] [x] [x] [x] [x] [x] [x] [x] [x] [x]  5 4 3 2 1 1 2 3 4 5                          Right                                        Left    Thickness  [x] [x] [x] [x] [x] [x] [x] [x] [x] [x]  5 4 3 2 1 1 2 3 4 5                         Right                                        Left    Dystrophic Changes   [x] [x] [x] [x] [x] [x] [x] [x] [x] [x]  5 4 3 2 1 1 2 3 4 5                         Right                                        Left    Color  [x] [x] [x] [x] [x] [x] [x] [x] [x] [x]  5 4 3 2 1 1 2 3 4 5                          Right                                        Left    Incurvation/Ingrowin   [] [] [] [] [] [] [] [] [] []  5 4 3 2 1 1 2 3 4 5                         Right                                        Left    Inflammation/Pain   [x] [x] [x] [x] [x] [x] [x] [x] [x] [x]  5 4 3  2 1 1 2 3 4 5                         Right                                        Left       Nails are painful 1-10 with direct palpation. Dermatologic Exam:  Skin lesion present to the right and left plantar foot with a central core and petchaie noted to the lesions periphery.   Pain on palpation of the lesion      Musculoskeletal:     1st MPJ ROM decreased, Bilateral.  Muscle strength 5/5, Bilateral.  Pain present upon palpation of toenails 1-5, Bilateral. decreased medial longitudinal arch, Bilateral.  Ankle ROM decreased,Bilateral.    Dorsally contracted digits present digits 2, Bilateral.     Vascular: DP and PT pulses palpable 1/4, Bilateral.  CFT <5 seconds, Bilateral.  Hair growth absent to the level of the digits, Bilateral.

## 2018-09-22 ENCOUNTER — HOSPITAL ENCOUNTER (OUTPATIENT)
Age: 63
Discharge: HOME | End: 2018-09-22
Payer: COMMERCIAL

## 2018-09-22 LAB
ALBUMIN SERPL-MCNC: 4.3 G/DL (ref 3.5–5.2)
ALBUMIN/GLOBULIN RATIO: ABNORMAL (ref 1–2.5)
ALP BLD-CCNC: 83 U/L (ref 35–104)
ALT SERPL-CCNC: 19 U/L (ref 5–33)
ANION GAP SERPL CALCULATED.3IONS-SCNC: 10 MMOL/L (ref 9–17)
AST SERPL-CCNC: 23 U/L
BILIRUB SERPL-MCNC: 0.56 MG/DL (ref 0.3–1.2)
BUN BLDV-MCNC: 28 MG/DL (ref 8–23)
BUN/CREAT BLD: 29 (ref 9–20)
CALCIUM SERPL-MCNC: 9.7 MG/DL (ref 8.6–10.4)
CHLORIDE BLD-SCNC: 103 MMOL/L (ref 98–107)
CHOLESTEROL, FASTING: 143 MG/DL
CHOLESTEROL/HDL RATIO: 3
CO2: 27 MMOL/L (ref 20–31)
CREAT SERPL-MCNC: 0.98 MG/DL (ref 0.5–0.9)
GFR AFRICAN AMERICAN: >60 ML/MIN
GFR NON-AFRICAN AMERICAN: 58 ML/MIN
GFR SERPL CREATININE-BSD FRML MDRD: ABNORMAL ML/MIN/{1.73_M2}
GFR SERPL CREATININE-BSD FRML MDRD: ABNORMAL ML/MIN/{1.73_M2}
GLUCOSE BLD-MCNC: 101 MG/DL (ref 70–99)
HDLC SERPL-MCNC: 47 MG/DL
LDL CHOLESTEROL: 81 MG/DL (ref 0–130)
POTASSIUM SERPL-SCNC: 4.6 MMOL/L (ref 3.7–5.3)
SODIUM BLD-SCNC: 140 MMOL/L (ref 135–144)
TOTAL CK: 106 U/L (ref 26–192)
TOTAL PROTEIN: 7.8 G/DL (ref 6.4–8.3)
TRIGLYCERIDE, FASTING: 75 MG/DL
TSH SERPL DL<=0.05 MIU/L-ACNC: 3.81 MIU/L (ref 0.3–5)
VLDLC SERPL CALC-MCNC: NORMAL MG/DL (ref 1–30)

## 2018-09-22 PROCEDURE — 36415 COLL VENOUS BLD VENIPUNCTURE: CPT

## 2018-09-22 PROCEDURE — 80061 LIPID PANEL: CPT

## 2018-09-22 PROCEDURE — 84443 ASSAY THYROID STIM HORMONE: CPT

## 2018-09-22 PROCEDURE — 80053 COMPREHEN METABOLIC PANEL: CPT

## 2018-09-22 PROCEDURE — 82550 ASSAY OF CK (CPK): CPT

## 2018-10-02 ENCOUNTER — OFFICE VISIT (OUTPATIENT)
Dept: PODIATRY | Age: 63
End: 2018-10-02
Payer: COMMERCIAL

## 2018-10-02 VITALS — HEIGHT: 62 IN | RESPIRATION RATE: 16 BRPM | BODY MASS INDEX: 51.53 KG/M2 | WEIGHT: 280 LBS

## 2018-10-02 DIAGNOSIS — M79.672 BILATERAL FOOT PAIN: Primary | ICD-10-CM

## 2018-10-02 DIAGNOSIS — B35.1 DERMATOPHYTOSIS OF NAIL: ICD-10-CM

## 2018-10-02 DIAGNOSIS — M79.671 BILATERAL FOOT PAIN: Primary | ICD-10-CM

## 2018-10-02 DIAGNOSIS — I73.9 PVD (PERIPHERAL VASCULAR DISEASE) (HCC): ICD-10-CM

## 2018-10-02 PROCEDURE — G8427 DOCREV CUR MEDS BY ELIG CLIN: HCPCS | Performed by: PODIATRIST

## 2018-10-02 PROCEDURE — 3017F COLORECTAL CA SCREEN DOC REV: CPT | Performed by: PODIATRIST

## 2018-10-02 PROCEDURE — G8484 FLU IMMUNIZE NO ADMIN: HCPCS | Performed by: PODIATRIST

## 2018-10-02 PROCEDURE — 1036F TOBACCO NON-USER: CPT | Performed by: PODIATRIST

## 2018-10-02 PROCEDURE — G8417 CALC BMI ABV UP PARAM F/U: HCPCS | Performed by: PODIATRIST

## 2018-10-02 PROCEDURE — 11721 DEBRIDE NAIL 6 OR MORE: CPT | Performed by: PODIATRIST

## 2018-10-08 NOTE — PROGRESS NOTES
Negative for weakness and numbness. Dermatological ROS: negative for - mole changes, rash  Cardiovascular: Negative for leg swelling. Gastrointestinal: Negative for constipation, diarrhea, nausea and vomiting. Objective:  General: AAO x 3 in NAD. Derm  Toenail Description  Sites of Onychomycosis Involvement (Check affected area)  [x] [x] [x] [x] [x] [x] [x] [x] [x] [x]  5 4 3 2 1 1 2 3 4 5                          Right                                        Left    Thickness  [x] [x] [x] [x] [x] [x] [x] [x] [x] [x]  5 4 3 2 1 1 2 3 4 5                         Right                                        Left    Dystrophic Changes   [x] [x] [x] [x] [x] [x] [x] [x] [x] [x]  5 4 3 2 1 1 2 3 4 5                         Right                                        Left    Color  [x] [x] [x] [x] [x] [x] [x] [x] [x] [x]  5 4 3 2 1 1 2 3 4 5                          Right                                        Left    Incurvation/Ingrowin   [] [] [] [] [] [] [] [] [] []  5 4 3 2 1 1 2 3 4 5                         Right                                        Left    Inflammation/Pain   [x] [x] [x] [x] [x] [x] [x] [x] [x] [x]  5 4 3  2 1 1 2 3 4 5                         Right                                        Left       Nails are painful 1-10 with direct palpation. Dermatologic Exam:  No skin lesion present today.        Musculoskeletal:     1st MPJ ROM decreased, Bilateral.  Muscle strength 5/5, Bilateral.  Pain present upon palpation of toenails 1-5, Bilateral. decreased medial longitudinal arch, Bilateral.  Ankle ROM decreased,Bilateral.    Dorsally contracted digits present digits 2, Bilateral.     Vascular: DP and PT pulses palpable 1/4, Bilateral.  CFT <5 seconds, Bilateral.  Hair growth absent to the level of the digits, Bilateral.  Edema present, Bilateral.  Varicosities absent, Bilateral. Erythema absent, Bilateral      Integument: Warm, dry, supple, Bilateral.  Open lesion absent,

## 2018-12-11 ENCOUNTER — OFFICE VISIT (OUTPATIENT)
Dept: PODIATRY | Age: 63
End: 2018-12-11
Payer: COMMERCIAL

## 2018-12-11 VITALS — WEIGHT: 280 LBS | BODY MASS INDEX: 51.53 KG/M2 | HEIGHT: 62 IN

## 2018-12-11 DIAGNOSIS — I73.9 PVD (PERIPHERAL VASCULAR DISEASE) (HCC): ICD-10-CM

## 2018-12-11 DIAGNOSIS — B35.1 DERMATOPHYTOSIS OF NAIL: Primary | ICD-10-CM

## 2018-12-11 DIAGNOSIS — M79.672 PAIN IN BOTH FEET: ICD-10-CM

## 2018-12-11 DIAGNOSIS — M79.671 PAIN IN BOTH FEET: ICD-10-CM

## 2018-12-11 PROCEDURE — G8427 DOCREV CUR MEDS BY ELIG CLIN: HCPCS | Performed by: PODIATRIST

## 2018-12-11 PROCEDURE — 1036F TOBACCO NON-USER: CPT | Performed by: PODIATRIST

## 2018-12-11 PROCEDURE — G8484 FLU IMMUNIZE NO ADMIN: HCPCS | Performed by: PODIATRIST

## 2018-12-11 PROCEDURE — G8417 CALC BMI ABV UP PARAM F/U: HCPCS | Performed by: PODIATRIST

## 2018-12-11 PROCEDURE — 3017F COLORECTAL CA SCREEN DOC REV: CPT | Performed by: PODIATRIST

## 2018-12-11 PROCEDURE — 11721 DEBRIDE NAIL 6 OR MORE: CPT | Performed by: PODIATRIST

## 2018-12-11 PROCEDURE — 99213 OFFICE O/P EST LOW 20 MIN: CPT | Performed by: PODIATRIST

## 2018-12-11 NOTE — PROGRESS NOTES
[facility] PHYSICIANS  [facility] [address] [place] [address]  Dept: [phone]  Dept Fax: [phone]    NAIL PAIN PROGRESS NOTE  Date of patient's visit: 12/11/2018  Patient's Name:  Lennox Gibbons YOB: 1955            Patient Care Team:  Jaspreet Shanks MD as Consulting Physician (Cardiology)  Devi Dotson MD as Surgeon (Cardiothoracic Surgery)  Jose Hinds MD as Surgeon (General Surgery)  Wyatt Najera DPM as Physician (Podiatry)      Chief Complaint   Patient presents with    Nail Problem    Foot Pain       Subjective: This Lennox Gibbons comes to clinic for foot and nail care. Pt currently has complaint of thickened, painful, elongated nails that he/she cannot manage by themselves. Pt. Relates pain to nails with shoe gear.   Pt saw Chele Chiang MD in January of this year  Past Medical History:   Diagnosis Date    Abnormal EKG     Abnormal Pap smear     ASCUS 5/06    AI (aortic insufficiency)     Arthritis     general    Ascending aortic aneurysm (HCC) 12/6/13    repair    Atrial fibrillation [facility] [address]     Blood type AB+     CHF [facility]congestive heart failure) ([facility]     Dyspnea     HSV (herpes simplex virus) infection     Hyperlipidemia     Hypertension     Knee injury 2013    left    MR (mitral regurgitation) 12/6/13    repair    Pulmonary hypertension ([facility])     Thickened endometrium     Tricuspid regurgitation     Wears glasses     READING       Allergies   Allergen Reactions    Norvasc [Amlodipine] Other (See Comments)     Open sores on legs    Lisinopril Other (See Comments)     COUGH  COUGH  COUGH      Sulfa Antibiotics Itching     RED EYES    Sulfasalazine Itching     RED EYES     Current Outpatient Prescriptions on File Prior to Visit   Medication Sig Dispense Refill    metoprolol succinate (TOPROL XL) 100 MG extended release tablet Take 1 tablet by mouth 2 times daily      spironolactone (ALDACTONE) 50 MG

## 2019-01-11 ENCOUNTER — HOSPITAL ENCOUNTER (OUTPATIENT)
Age: 64
Setting detail: SPECIMEN
Discharge: HOME | End: 2019-01-11
Payer: COMMERCIAL

## 2019-01-11 DIAGNOSIS — Z12.31 ENCOUNTER FOR SCREENING MAMMOGRAM FOR MALIGNANT NEOPLASM OF BREAST: Primary | ICD-10-CM

## 2019-01-11 LAB
ALBUMIN SERPL-MCNC: 4.2 G/DL (ref 3.5–5.2)
ALBUMIN/GLOBULIN RATIO: 1.2 (ref 1–2.5)
ALP BLD-CCNC: 70 U/L (ref 35–104)
ALT SERPL-CCNC: 18 U/L (ref 5–33)
ANION GAP SERPL CALCULATED.3IONS-SCNC: 12 MMOL/L (ref 9–17)
AST SERPL-CCNC: 24 U/L
BILIRUB SERPL-MCNC: 0.59 MG/DL (ref 0.3–1.2)
BUN BLDV-MCNC: 20 MG/DL (ref 8–23)
BUN/CREAT BLD: ABNORMAL (ref 9–20)
CALCIUM SERPL-MCNC: 10.1 MG/DL (ref 8.6–10.4)
CHLORIDE BLD-SCNC: 103 MMOL/L (ref 98–107)
CO2: 26 MMOL/L (ref 20–31)
CREAT SERPL-MCNC: 0.85 MG/DL (ref 0.5–0.9)
D-DIMER QUANTITATIVE: 6.33 MG/L FEU
ESTIMATED AVERAGE GLUCOSE: 120 MG/DL
GFR AFRICAN AMERICAN: >60 ML/MIN
GFR NON-AFRICAN AMERICAN: >60 ML/MIN
GFR SERPL CREATININE-BSD FRML MDRD: ABNORMAL ML/MIN/{1.73_M2}
GFR SERPL CREATININE-BSD FRML MDRD: ABNORMAL ML/MIN/{1.73_M2}
GLUCOSE BLD-MCNC: 105 MG/DL (ref 70–99)
HBA1C MFR BLD: 5.8 % (ref 4–6)
HCT VFR BLD CALC: 47.8 % (ref 36.3–47.1)
HEMOGLOBIN: 15.3 G/DL (ref 11.9–15.1)
MAGNESIUM: 2.1 MG/DL (ref 1.6–2.6)
MCH RBC QN AUTO: 32.7 PG (ref 25.2–33.5)
MCHC RBC AUTO-ENTMCNC: 32 G/DL (ref 28.4–34.8)
MCV RBC AUTO: 102.1 FL (ref 82.6–102.9)
NRBC AUTOMATED: 0 PER 100 WBC
PDW BLD-RTO: 14.3 % (ref 11.8–14.4)
PLATELET # BLD: 157 K/UL (ref 138–453)
PMV BLD AUTO: 10 FL (ref 8.1–13.5)
POTASSIUM SERPL-SCNC: 4.7 MMOL/L (ref 3.7–5.3)
RBC # BLD: 4.68 M/UL (ref 3.95–5.11)
SODIUM BLD-SCNC: 141 MMOL/L (ref 135–144)
TOTAL PROTEIN: 7.8 G/DL (ref 6.4–8.3)
WBC # BLD: 2.9 K/UL (ref 3.5–11.3)

## 2019-01-23 ENCOUNTER — HOSPITAL ENCOUNTER (OUTPATIENT)
Dept: MAMMOGRAPHY | Age: 64
Discharge: HOME | End: 2019-01-25
Payer: COMMERCIAL

## 2019-01-23 DIAGNOSIS — Z12.31 ENCOUNTER FOR SCREENING MAMMOGRAM FOR MALIGNANT NEOPLASM OF BREAST: ICD-10-CM

## 2019-01-23 PROCEDURE — 77067 SCR MAMMO BI INCL CAD: CPT

## 2019-02-14 ENCOUNTER — HOSPITAL ENCOUNTER (OUTPATIENT)
Facility: CLINIC | Age: 64
Discharge: HOME | End: 2019-02-16
Payer: COMMERCIAL

## 2019-02-14 ENCOUNTER — HOSPITAL ENCOUNTER (OUTPATIENT)
Dept: GENERAL RADIOLOGY | Facility: CLINIC | Age: 64
Discharge: HOME | End: 2019-02-16
Payer: COMMERCIAL

## 2019-02-14 DIAGNOSIS — M25.521 RIGHT ELBOW PAIN: ICD-10-CM

## 2019-02-14 PROCEDURE — 73070 X-RAY EXAM OF ELBOW: CPT

## 2019-02-28 ENCOUNTER — OFFICE VISIT (OUTPATIENT)
Dept: PODIATRY | Age: 64
End: 2019-02-28
Payer: COMMERCIAL

## 2019-02-28 VITALS — BODY MASS INDEX: 49.61 KG/M2 | HEIGHT: 63 IN | WEIGHT: 280 LBS

## 2019-02-28 DIAGNOSIS — M79.672 FOOT PAIN, BILATERAL: ICD-10-CM

## 2019-02-28 DIAGNOSIS — B35.1 DERMATOPHYTOSIS OF NAIL: Primary | ICD-10-CM

## 2019-02-28 DIAGNOSIS — D23.71 BENIGN NEOPLASM OF SKIN OF LOWER LIMB, INCLUDING HIP, RIGHT: ICD-10-CM

## 2019-02-28 DIAGNOSIS — R26.2 TROUBLE WALKING: ICD-10-CM

## 2019-02-28 DIAGNOSIS — D23.72 BENIGN NEOPLASM OF SKIN OF LOWER LIMB, INCLUDING HIP, LEFT: ICD-10-CM

## 2019-02-28 DIAGNOSIS — M79.671 FOOT PAIN, BILATERAL: ICD-10-CM

## 2019-02-28 PROCEDURE — G8417 CALC BMI ABV UP PARAM F/U: HCPCS | Performed by: PODIATRIST

## 2019-02-28 PROCEDURE — 99213 OFFICE O/P EST LOW 20 MIN: CPT | Performed by: PODIATRIST

## 2019-02-28 PROCEDURE — 3017F COLORECTAL CA SCREEN DOC REV: CPT | Performed by: PODIATRIST

## 2019-02-28 PROCEDURE — G8427 DOCREV CUR MEDS BY ELIG CLIN: HCPCS | Performed by: PODIATRIST

## 2019-02-28 PROCEDURE — 1036F TOBACCO NON-USER: CPT | Performed by: PODIATRIST

## 2019-02-28 PROCEDURE — G8484 FLU IMMUNIZE NO ADMIN: HCPCS | Performed by: PODIATRIST

## 2019-02-28 PROCEDURE — 17110 DESTRUCTION B9 LES UP TO 14: CPT | Performed by: PODIATRIST

## 2019-02-28 PROCEDURE — 11721 DEBRIDE NAIL 6 OR MORE: CPT | Performed by: PODIATRIST

## 2019-03-19 ENCOUNTER — OFFICE VISIT (OUTPATIENT)
Dept: OBGYN CLINIC | Age: 64
End: 2019-03-19
Payer: COMMERCIAL

## 2019-03-19 ENCOUNTER — HOSPITAL ENCOUNTER (OUTPATIENT)
Age: 64
Setting detail: SPECIMEN
Discharge: HOME | End: 2019-03-19
Payer: COMMERCIAL

## 2019-03-19 VITALS — SYSTOLIC BLOOD PRESSURE: 122 MMHG | BODY MASS INDEX: 49.6 KG/M2 | DIASTOLIC BLOOD PRESSURE: 66 MMHG | WEIGHT: 280 LBS

## 2019-03-19 DIAGNOSIS — Z12.31 ENCOUNTER FOR SCREENING MAMMOGRAM FOR BREAST CANCER: ICD-10-CM

## 2019-03-19 DIAGNOSIS — Z12.11 COLON CANCER SCREENING: ICD-10-CM

## 2019-03-19 DIAGNOSIS — Z01.419 ENCOUNTER FOR GYNECOLOGICAL EXAMINATION: Primary | ICD-10-CM

## 2019-03-19 PROCEDURE — 99396 PREV VISIT EST AGE 40-64: CPT | Performed by: OBSTETRICS & GYNECOLOGY

## 2019-03-19 PROCEDURE — G8484 FLU IMMUNIZE NO ADMIN: HCPCS | Performed by: OBSTETRICS & GYNECOLOGY

## 2019-03-19 ASSESSMENT — ENCOUNTER SYMPTOMS
CONSTIPATION: 0
COUGH: 0
DIARRHEA: 0
NAUSEA: 0
WHEEZING: 0
ABDOMINAL PAIN: 0
VOMITING: 0

## 2019-04-01 LAB — CYTOLOGY REPORT: NORMAL

## 2019-04-27 ENCOUNTER — HOSPITAL ENCOUNTER (OUTPATIENT)
Age: 64
Discharge: HOME | End: 2019-04-27
Payer: COMMERCIAL

## 2019-04-27 LAB
CHOLESTEROL, FASTING: 163 MG/DL
CHOLESTEROL/HDL RATIO: 3.1
HDLC SERPL-MCNC: 52 MG/DL
LDL CHOLESTEROL: 93 MG/DL (ref 0–130)
TRIGLYCERIDE, FASTING: 91 MG/DL
TSH SERPL DL<=0.05 MIU/L-ACNC: 4.67 MIU/L (ref 0.3–5)
VLDLC SERPL CALC-MCNC: NORMAL MG/DL (ref 1–30)

## 2019-04-27 PROCEDURE — 80061 LIPID PANEL: CPT

## 2019-04-27 PROCEDURE — 84443 ASSAY THYROID STIM HORMONE: CPT

## 2019-04-27 PROCEDURE — 36415 COLL VENOUS BLD VENIPUNCTURE: CPT

## 2019-05-16 ENCOUNTER — OFFICE VISIT (OUTPATIENT)
Dept: PODIATRY | Age: 64
End: 2019-05-16
Payer: COMMERCIAL

## 2019-05-16 VITALS — HEIGHT: 65 IN | RESPIRATION RATE: 16 BRPM | WEIGHT: 280 LBS | BODY MASS INDEX: 46.65 KG/M2

## 2019-05-16 DIAGNOSIS — B35.1 DERMATOPHYTOSIS OF NAIL: ICD-10-CM

## 2019-05-16 DIAGNOSIS — I73.9 PVD (PERIPHERAL VASCULAR DISEASE) (HCC): ICD-10-CM

## 2019-05-16 DIAGNOSIS — M79.672 PAIN IN BOTH FEET: ICD-10-CM

## 2019-05-16 DIAGNOSIS — R26.2 TROUBLE WALKING: ICD-10-CM

## 2019-05-16 DIAGNOSIS — D23.72 BENIGN NEOPLASM OF SKIN OF LOWER LIMB, INCLUDING HIP, LEFT: ICD-10-CM

## 2019-05-16 DIAGNOSIS — D23.71 BENIGN NEOPLASM OF SKIN OF LOWER LIMB, INCLUDING HIP, RIGHT: Primary | ICD-10-CM

## 2019-05-16 DIAGNOSIS — M79.671 PAIN IN BOTH FEET: ICD-10-CM

## 2019-05-16 PROCEDURE — 3017F COLORECTAL CA SCREEN DOC REV: CPT | Performed by: PODIATRIST

## 2019-05-16 PROCEDURE — G8417 CALC BMI ABV UP PARAM F/U: HCPCS | Performed by: PODIATRIST

## 2019-05-16 PROCEDURE — G8428 CUR MEDS NOT DOCUMENT: HCPCS | Performed by: PODIATRIST

## 2019-05-16 PROCEDURE — 11721 DEBRIDE NAIL 6 OR MORE: CPT | Performed by: PODIATRIST

## 2019-05-16 PROCEDURE — 1036F TOBACCO NON-USER: CPT | Performed by: PODIATRIST

## 2019-05-16 PROCEDURE — 17110 DESTRUCTION B9 LES UP TO 14: CPT | Performed by: PODIATRIST

## 2019-05-16 NOTE — PROGRESS NOTES
[facility] PHYSICIANS  [facility] [address]  [place] [address]  Dept: [phone]  Dept Fax: [phone]    NAIL PAIN PROGRESS NOTE  Date of patient's visit: 5/16/2019  Patient's Name:  Vince Cantu YOB: 1955            Patient Care Team:  Charly Murcia DO as PCP - General (Family Medicine)  Sandhya Martin MD as Consulting Physician (Cardiology)  Khloe Sheehan MD as Surgeon (Cardiothoracic Surgery)  Dione Alfaro MD as Surgeon (General Surgery)  Laura Mejias DPM as Physician (Podiatry)  Nazanin Montoya MD as Consulting Physician (Obstetrics & Gynecology)      Chief Complaint   Patient presents with    Nail Problem    Foot Pain       Subjective: This Vince Cantu comes to clinic for foot and nail care. Pt currently has complaint of thickened, painful, elongated nails that he/she cannot manage by themselves. Pt. Relates pain to nails with shoe gear. Pt's primary care physician is Charly Murcia DO last seen 12/12/18. Pt also relates to painful skin spots to the bottom of both feet.   Pt has tried pads and changing shoes but it has note helped the pain    Past Medical History:   Diagnosis Date    Abnormal EKG     Abnormal Pap smear     ASCUS 5/06    AI (aortic insufficiency)     Arthritis     general    Ascending aortic aneurysm (HCC) 12/6/13    repair    Atrial fibrillation ([address]     Blood type AB+     CHF (congestive heart failure) (HCC)     Dyspnea     HSV (herpes simplex virus) infection     Hyperlipidemia     Hypertension     Knee injury 2013    left    MR (mitral regurgitation) 12/6/13    repair    Pulmonary hypertension ([address]     Thickened endometrium     Tricuspid regurgitation     Wears glasses     READING       Allergies   Allergen Reactions    Norvasc [Amlodipine] Other (See Comments)     Open sores on legs    Lisinopril Other (See Comments)     COUGH  COUGH  COUGH      Sulfa Antibiotics leg swelling. Gastrointestinal: Negative for constipation, diarrhea, nausea and vomiting. Objective:  General: AAO x 3 in NAD. Derm  Toenail Description  Sites of Onychomycosis Involvement (Check affected area)  [x] [x] [x] [x] [x] [x] [x] [x] [x] [x]  5 4 3 2 1 1 2 3 4 5                          Right                                        Left    Thickness  [x] [x] [x] [x] [x] [x] [x] [x] [x] [x]  5 4 3 2 1 1 2 3 4 5                         Right                                        Left    Dystrophic Changes   [x] [x] [x] [x] [x] [x] [x] [x] [x] [x]  5 4 3 2 1 1 2 3 4 5                         Right                                        Left    Color  [x] [x] [x] [x] [x] [x] [x] [x] [x] [x]  5 4 3 2 1 1 2 3 4 5                          Right                                        Left    Incurvation/Ingrowin   [] [] [] [] [] [] [] [] [] []  5 4 3 2 1 1 2 3 4 5                         Right                                        Left    Inflammation/Pain   [x] [x] [x] [x] [x] [x] [x] [x] [x] [x]  5 4 3  2 1 1 2 3 4 5                         Right                                        Left       Nails are painful 1-10 with direct palpation. Dermatologic:    Skin lesion present to the right and left plantar foot with a central core and petchaie noted to the lesions periphery.   Pain on palpation of the lesion    Musculoskeletal:     1st MPJ ROM decreased, Bilateral.  Muscle strength 5/5, Bilateral.  Pain present upon palpation of toenails 1-5, Bilateral. decreased medial longitudinal arch, Bilateral.  Ankle ROM decreased,Bilateral.    Dorsally contracted digits present digits 2, Bilateral.     Vascular: DP and PT pulses palpable 1/4, Bilateral.  CFT <5 seconds, Bilateral.  Hair growth absent to the level of the digits, Bilateral.  Edema present, Bilateral.  Varicosities absent, Bilateral. Erythema absent, Bilateral    Integument: Warm, dry, supple, Bilateral.  Open lesion absent, Bilateral. Interdigital maceration absent to web spaces 4, Bilateral.  Nails 1-5 left and 1-5 right thickened > 3.0 mm, dystrophic and crumbly, discolored with subungual debris. Fissures absent, Bilateral.   Neurological:  Sensation present to light touch to level of digits, Bilateral.      Assessment:  62 y.o. female with:    Diagnosis Orders   1. Benign neoplasm of skin of lower limb, including hip, right  80335 - [place] DESTRUCTION BENIGN LESIONS UP TO 14   2. Benign neoplasm of skin of lower limb, including hip, left  51294 - [place] DESTRUCTION BENIGN LESIONS UP TO 14   3. Pain in both feet  50575 - [place] DEBRIDEMENT OF NAILS, 6 OR MORE    43941 - [place] DESTRUCTION BENIGN LESIONS UP TO 14   4. Dermatophytosis of nail  46868 - [place] DEBRIDEMENT OF NAILS, 6 OR MORE   5. Trouble walking  64210 - [place] DEBRIDEMENT OF NAILS, 6 OR MORE    19161 - [place] DESTRUCTION BENIGN LESIONS UP TO 14   6. PVD (peripheral vascular disease) ([facility] [address]  69877 - [place] DEBRIDEMENT OF NAILS, 6 OR MORE    18684 - [place] DESTRUCTION BENIGN LESIONS UP TO 14         Plan:   Pt was evaluated and examined. Patient was given personalized discharge instructions. The lesion was partially excised and Pyrogallic acid was applied under occlusion. The patient will leave in place for 24-48 hours and than remove. The patient tolerated the procedure well and without complication. Nails 1-10 were debrided in length and thickness sharply with a nail nipper and  without incident. Pt will follow up in 9 weeks or sooner if any problems arise. Diagnosis was discussed with the pt and all of their questions were answered in detail. Proper foot hygiene and care was discussed with the pt. Patient to check feet daily and contact the office with any questions/problems/concerns. Other comorbidity noted and will be managed by PCP. Pain waiver discussed with patient and confirmed.    5/16/2019      Electronically signed by Haley Momin DPM on 5/16/2019 at 3:57 PM  5/16/2019

## 2019-06-07 DIAGNOSIS — Z12.11 COLON CANCER SCREENING: ICD-10-CM

## 2019-06-07 LAB
CONTROL: PRESENT
HEMOCCULT STL QL: NEGATIVE

## 2019-06-07 PROCEDURE — 82274 ASSAY TEST FOR BLOOD FECAL: CPT | Performed by: OBSTETRICS & GYNECOLOGY

## 2019-06-19 ENCOUNTER — TELEPHONE (OUTPATIENT)
Dept: OBGYN CLINIC | Age: 64
End: 2019-06-19

## 2019-06-19 NOTE — TELEPHONE ENCOUNTER
Pt calling back spoke to [facility] at [facility], colonoscopy done 04/20/11, normal and . They are sending copy over.

## 2019-06-19 NOTE — TELEPHONE ENCOUNTER
Pt was calling to find out when her last colonoscopy was and was waiting to here back. Pulled pt paper chart. No documentation of when her colonoscopy was. Gave pt number [facility] to call to find when her last colonoscopy was. Pt know that she went to GI office next to [facility] old office on [address]. Pt going to call and have them send [place] a copy.

## 2019-07-18 ENCOUNTER — HOSPITAL ENCOUNTER (OUTPATIENT)
Age: 64
Setting detail: SPECIMEN
Discharge: HOME | End: 2019-07-18
Payer: COMMERCIAL

## 2019-07-18 ENCOUNTER — OFFICE VISIT (OUTPATIENT)
Dept: OBGYN CLINIC | Age: 64
End: 2019-07-18
Payer: COMMERCIAL

## 2019-07-18 VITALS
BODY MASS INDEX: 46.32 KG/M2 | HEIGHT: 65 IN | DIASTOLIC BLOOD PRESSURE: 76 MMHG | SYSTOLIC BLOOD PRESSURE: 118 MMHG | WEIGHT: 278 LBS

## 2019-07-18 DIAGNOSIS — N89.8 VAGINAL DISCHARGE: ICD-10-CM

## 2019-07-18 DIAGNOSIS — Z20.2 POSSIBLE EXPOSURE TO STD: ICD-10-CM

## 2019-07-18 DIAGNOSIS — R39.15 URINARY URGENCY: Primary | ICD-10-CM

## 2019-07-18 DIAGNOSIS — R39.15 URINARY URGENCY: ICD-10-CM

## 2019-07-18 LAB
BILIRUBIN URINE: NEGATIVE
COLOR: YELLOW
COMMENT UA: NORMAL
DIRECT EXAM: ABNORMAL
GLUCOSE URINE: NEGATIVE
KETONES, URINE: NEGATIVE
LEUKOCYTE ESTERASE, URINE: NEGATIVE
Lab: ABNORMAL
NITRITE, URINE: NEGATIVE
PH UA: 5 (ref 5–8)
PROTEIN UA: NEGATIVE
SPECIFIC GRAVITY UA: 1.01 (ref 1–1.03)
SPECIMEN DESCRIPTION: ABNORMAL
TURBIDITY: CLEAR
URINE HGB: NEGATIVE
UROBILINOGEN, URINE: NORMAL

## 2019-07-18 PROCEDURE — G8417 CALC BMI ABV UP PARAM F/U: HCPCS | Performed by: OBSTETRICS & GYNECOLOGY

## 2019-07-18 PROCEDURE — 99213 OFFICE O/P EST LOW 20 MIN: CPT | Performed by: OBSTETRICS & GYNECOLOGY

## 2019-07-18 PROCEDURE — G8428 CUR MEDS NOT DOCUMENT: HCPCS | Performed by: OBSTETRICS & GYNECOLOGY

## 2019-07-18 PROCEDURE — 3017F COLORECTAL CA SCREEN DOC REV: CPT | Performed by: OBSTETRICS & GYNECOLOGY

## 2019-07-18 PROCEDURE — 1036F TOBACCO NON-USER: CPT | Performed by: OBSTETRICS & GYNECOLOGY

## 2019-07-18 PROCEDURE — 81003 URINALYSIS AUTO W/O SCOPE: CPT | Performed by: OBSTETRICS & GYNECOLOGY

## 2019-07-18 ASSESSMENT — ENCOUNTER SYMPTOMS
COUGH: 0
DIARRHEA: 0
ABDOMINAL PAIN: 0
CONSTIPATION: 0
VOMITING: 0
WHEEZING: 0
NAUSEA: 0

## 2019-07-18 NOTE — PROGRESS NOTES
ECHOCARDIOGRAM      TUBAL LIGATION  10/05/80    VARICOSE VEIN SURGERY Bilateral 6/2008     Family History   Problem Relation Age of Onset    Diabetes Mother     Kidney Disease Mother         failure    Hypertension Mother     Heart Attack Father     Diabetes Father     High Blood Pressure Father     Cancer Sister         pancreas    High Blood Pressure Sister     Hypertension Sister     High Blood Pressure Sister     Hypertension Sister     Diabetes Sister     High Blood Pressure Sister     Rashes/Skin Problems Sister     High Blood Pressure Sister     Cancer Paternal Grandmother     High Blood Pressure Brother     High Blood Pressure Brother      Social History     Tobacco Use   Smoking Status Never Smoker   Smokeless Tobacco Never Used     Social History     Substance and Sexual Activity   Alcohol Use Yes    Alcohol/week: 1.0 standard drinks    Types: 1 Cans of beer per week    Comment: ONCE EVERY 3-4 MONTHS     Current Outpatient Medications   Medication Sig Dispense Refill    Misc. Devices MISC 1 each by Does not apply route once as needed (with urination) 1 Device 0    metoprolol succinate (TOPROL XL) 100 MG extended release tablet Take 1 tablet by mouth 2 times daily      spironolactone (ALDACTONE) 50 MG tablet Take 50 mg by mouth daily      calcium carbonate (CALCIUM 600) 600 MG TABS tablet Take 1 tablet by mouth 2 times daily      Multiple Vitamins-Minerals (MULTIVITAMIN ADULT PO) Take by mouth daily      atorvastatin (LIPITOR) 20 MG tablet Take 20 mg by mouth daily      vitamin E 1000 UNITS capsule Take 400 Units by mouth 2 times daily       amiodarone (PACERONE) 100 MG tablet Take 100 mg by mouth daily       Ascorbic Acid (VITAMIN C) 250 MG tablet Take 500 mg by mouth 2 times daily       potassium chloride SA (K-DUR;KLOR-CON M) 20 MEQ tablet Take 1 tablet by mouth daily. 60 tablet 3    furosemide (LASIX) 40 MG tablet Take 1 tablet by mouth 2 times daily.  (Patient taking labia. There is no rash, tenderness, lesion or injury on the left labia. Uterus is not deviated, not enlarged, not fixed and not tender. Cervix exhibits no motion tenderness, no discharge and no friability. Right adnexum displays no mass, no tenderness and no fullness. Left adnexum displays no mass, no tenderness and no fullness. No erythema or tenderness in the vagina. No foreign body in the vagina. No signs of injury around the vagina. Vaginal discharge found. Genitourinary Comments: Toilet tissue noted on vulva, fishy odor noted   Musculoskeletal: Normal range of motion. Neurological: She is alert and oriented to person, place, and time. Skin: Skin is warm and dry. Psychiatric: She has a normal mood and affect. Her behavior is normal. Judgment and thought content normal.       ASSESSMENT:  Dolly Lamar is a 62 y.o. female      ICD-10-CM    1. Urinary urgency R39.15 Urinalysis Reflex to Culture     Vaginitis DNA Probe     Misc. Devices MISC     Urinalysis Reflex to Culture       PLAN:    Will send urinalysis reflex culture to evaluate for bacteruria and Affirm for suspicion for BV. Pt prefers to wait for results prior to treatment. Discussed safe sex practices.      Electronically signed by Zainab Yi MD on 7/18/2019 at 11:21 AM

## 2019-07-19 LAB
C TRACH DNA GENITAL QL NAA+PROBE: NEGATIVE
N. GONORRHOEAE DNA: NEGATIVE
SPECIMEN DESCRIPTION: NORMAL

## 2019-07-19 RX ORDER — METRONIDAZOLE 500 MG/1
500 TABLET ORAL 2 TIMES DAILY
Qty: 14 TABLET | Refills: 0 | Status: SHIPPED | OUTPATIENT
Start: 2019-07-19 | End: 2019-07-26

## 2019-12-07 ENCOUNTER — HOSPITAL ENCOUNTER (OUTPATIENT)
Age: 64
Discharge: HOME | End: 2019-12-07
Payer: COMMERCIAL

## 2019-12-07 LAB
ALBUMIN SERPL-MCNC: 4.3 G/DL (ref 3.5–5.2)
ALBUMIN/GLOBULIN RATIO: 1.3 (ref 1–2.5)
ALP BLD-CCNC: 64 U/L (ref 35–104)
ALT SERPL-CCNC: 19 U/L (ref 5–33)
ANION GAP SERPL CALCULATED.3IONS-SCNC: 10 MMOL/L (ref 9–17)
AST SERPL-CCNC: 22 U/L
BILIRUB SERPL-MCNC: 0.64 MG/DL (ref 0.3–1.2)
BUN BLDV-MCNC: 20 MG/DL (ref 8–23)
BUN/CREAT BLD: NORMAL (ref 9–20)
CALCIUM SERPL-MCNC: 9.8 MG/DL (ref 8.6–10.4)
CHLORIDE BLD-SCNC: 107 MMOL/L (ref 98–107)
CHOLESTEROL, FASTING: 142 MG/DL
CHOLESTEROL/HDL RATIO: 2.7
CO2: 27 MMOL/L (ref 20–31)
CREAT SERPL-MCNC: 0.78 MG/DL (ref 0.5–0.9)
GFR AFRICAN AMERICAN: >60 ML/MIN
GFR NON-AFRICAN AMERICAN: >60 ML/MIN
GFR SERPL CREATININE-BSD FRML MDRD: NORMAL ML/MIN/{1.73_M2}
GFR SERPL CREATININE-BSD FRML MDRD: NORMAL ML/MIN/{1.73_M2}
GLUCOSE BLD-MCNC: 96 MG/DL (ref 70–99)
HDLC SERPL-MCNC: 53 MG/DL
LDL CHOLESTEROL: 75 MG/DL (ref 0–130)
POTASSIUM SERPL-SCNC: 4.6 MMOL/L (ref 3.7–5.3)
SODIUM BLD-SCNC: 144 MMOL/L (ref 135–144)
TOTAL CK: 89 U/L (ref 26–192)
TOTAL PROTEIN: 7.6 G/DL (ref 6.4–8.3)
TRIGLYCERIDE, FASTING: 68 MG/DL
TSH SERPL DL<=0.05 MIU/L-ACNC: 2.79 MIU/L (ref 0.3–5)
VLDLC SERPL CALC-MCNC: NORMAL MG/DL (ref 1–30)

## 2019-12-07 PROCEDURE — 36415 COLL VENOUS BLD VENIPUNCTURE: CPT

## 2019-12-07 PROCEDURE — 82550 ASSAY OF CK (CPK): CPT

## 2019-12-07 PROCEDURE — 84443 ASSAY THYROID STIM HORMONE: CPT

## 2019-12-07 PROCEDURE — 80053 COMPREHEN METABOLIC PANEL: CPT

## 2019-12-07 PROCEDURE — 80061 LIPID PANEL: CPT

## 2020-06-02 ENCOUNTER — HOSPITAL ENCOUNTER (OUTPATIENT)
Age: 65
Setting detail: SPECIMEN
Discharge: HOME | End: 2020-06-02
Payer: COMMERCIAL

## 2020-06-02 ENCOUNTER — HOSPITAL ENCOUNTER (OUTPATIENT)
Dept: MAMMOGRAPHY | Age: 65
Discharge: HOME | End: 2020-06-04
Payer: COMMERCIAL

## 2020-06-02 ENCOUNTER — OFFICE VISIT (OUTPATIENT)
Dept: OBGYN CLINIC | Age: 65
End: 2020-06-02
Payer: COMMERCIAL

## 2020-06-02 VITALS
BODY MASS INDEX: 47.15 KG/M2 | WEIGHT: 283 LBS | HEIGHT: 65 IN | DIASTOLIC BLOOD PRESSURE: 68 MMHG | SYSTOLIC BLOOD PRESSURE: 124 MMHG

## 2020-06-02 PROCEDURE — 77063 BREAST TOMOSYNTHESIS BI: CPT

## 2020-06-02 PROCEDURE — 99396 PREV VISIT EST AGE 40-64: CPT | Performed by: OBSTETRICS & GYNECOLOGY

## 2020-06-02 RX ORDER — APIXABAN 5 MG/1
TABLET, FILM COATED ORAL
COMMUNITY
Start: 2020-05-11 | End: 2022-06-06

## 2020-06-02 ASSESSMENT — ENCOUNTER SYMPTOMS
CONSTIPATION: 0
ABDOMINAL PAIN: 0
DIARRHEA: 0
NAUSEA: 0
VOMITING: 0
WHEEZING: 0
COUGH: 0

## 2020-06-02 NOTE — PROGRESS NOTES
[facility] PHYSICIANS  [facility] [address] [address]  [address] [id number]  Dept: [phone]  OF VISIT:  20        History and Physical    Chrystal Bloch    :  1955  CHIEF COMPLAINT:    Chief Complaint   Patient presents with    Annual Exam     Prev Pap: 3/19/19 WNL; Prev Walt: 19 WNL; Prev Dexa:  Osteopenia                    HPI :   Chrystal Bloch is a 60 y.o. female    The patient was seen and examined. Per the patient bowels are regular. She has novoiding complaints. She denies any bloating as well as vaginal discharge. Mammogram this morning was Category I.  _____________________________________________________________________  Past Medical History:   Diagnosis Date    Abnormal EKG     Abnormal Pap smear     ASCUS     AI (aortic insufficiency)     Arthritis     general    Ascending aortic aneurysm (HCC) 13    repair    Atrial fibrillation ([address]     Blood type AB+     CHF (congestive heart failure) (HCC)     Dyspnea     HSV (herpes simplex virus) infection     Hyperlipidemia     Hypertension     Knee injury     left    MR (mitral regurgitation) 13    repair    Pulmonary hypertension ([address]     Thickened endometrium     Tricuspid regurgitation     Wears glasses     READING                                                                   Past Surgical History:   Procedure Laterality Date    ABDOMINAL AORTIC ANEURYSM REPAIR      with mitral valve repair    BREAST BIOPSY Left 2012    Benign fat necrosis    CARDIAC CATHETERIZATION  AUG.  2013    CERVIX SURGERY  95    conization - cystic endocervicitis    COLONOSCOPY      normal    COLONOSCOPY  2011    normal (/ NWO)    DILATION AND CURETTAGE      DILATION AND CURETTAGE OF UTERUS      FOOT SURGERY Bilateral     TOE NAILS REMOVED BIG AND SMALL TOES ONLY    HYSTEROSCOPY  06/10/2014    with D and C    MITRAL VALVE SURGERY  13    Screening mammogram discussed and advised yearly if normal starting at age 36.  - Calcium and Vitamin D dosing reviewed. - Colonoscopy screening reviewed. -General diet and exercise reviewed. - Routine health maintenance per patients PCP.     Electronically signed by Ysabel Horta MD on 6/2/2020at 12:18 PM  [place]

## 2020-06-05 LAB
HPV SAMPLE: NORMAL
HPV, GENOTYPE 16: NOT DETECTED
HPV, GENOTYPE 18: NOT DETECTED
HPV, HIGH RISK OTHER: NOT DETECTED
HPV, INTERPRETATION: NORMAL
SPECIMEN DESCRIPTION: NORMAL

## 2020-06-08 LAB — CYTOLOGY REPORT: NORMAL

## 2020-07-24 ENCOUNTER — HOSPITAL ENCOUNTER (OUTPATIENT)
Age: 65
Setting detail: SPECIMEN
Discharge: HOME | End: 2020-07-24
Payer: COMMERCIAL

## 2020-07-24 LAB — SARS-COV-2 ANTIBODY, TOTAL: NEGATIVE

## 2020-07-24 PROCEDURE — 36415 COLL VENOUS BLD VENIPUNCTURE: CPT

## 2020-07-24 PROCEDURE — 86769 SARS-COV-2 COVID-19 ANTIBODY: CPT

## 2020-08-17 ENCOUNTER — HOSPITAL ENCOUNTER (OUTPATIENT)
Age: 65
Discharge: HOME | End: 2020-08-17
Payer: COMMERCIAL

## 2020-08-17 PROCEDURE — 85306 CLOT INHIBIT PROT S FREE: CPT

## 2020-08-17 PROCEDURE — 36415 COLL VENOUS BLD VENIPUNCTURE: CPT

## 2020-08-17 PROCEDURE — 85210 CLOT FACTOR II PROTHROM SPEC: CPT

## 2020-08-17 PROCEDURE — 86147 CARDIOLIPIN ANTIBODY EA IG: CPT

## 2020-08-17 PROCEDURE — 85300 ANTITHROMBIN III ACTIVITY: CPT

## 2020-08-17 PROCEDURE — 85303 CLOT INHIBIT PROT C ACTIVITY: CPT

## 2020-08-17 PROCEDURE — 85245 CLOT FACTOR VIII VW RISTOCTN: CPT

## 2020-08-17 PROCEDURE — 81240 F2 GENE: CPT

## 2020-08-18 LAB
ANTICARDIOLIPIN IGA ANTIBODY: 1 APU
ANTICARDIOLIPIN IGG ANTIBODY: 8 GPU
AT-III ACTIVITY: 86 % (ref 83–122)
CARDIOLIPIN AB IGM: 3.5 MPU
FACTOR II ACTIVITY: 97 % (ref 50–150)
PROTEIN C ACTIVITY: 87 %

## 2020-08-19 LAB — RISTOCETIN CO-FACTOR: 192 % (ref 51–215)

## 2020-08-21 LAB — PROTEIN S ACTIVITY: 114 % (ref 59–130)

## 2020-08-24 LAB — PROTHROMBIN G20210A MUTATION: NORMAL

## 2020-08-28 ENCOUNTER — HOSPITAL ENCOUNTER (OUTPATIENT)
Age: 65
Setting detail: SPECIMEN
Discharge: HOME | End: 2020-08-28
Payer: COMMERCIAL

## 2020-09-08 LAB — SURGICAL PATHOLOGY REPORT: NORMAL

## 2021-03-08 ENCOUNTER — OFFICE VISIT (OUTPATIENT)
Dept: OBGYN CLINIC | Age: 66
End: 2021-03-08
Payer: COMMERCIAL

## 2021-03-08 VITALS
HEIGHT: 65 IN | DIASTOLIC BLOOD PRESSURE: 80 MMHG | TEMPERATURE: 98.6 F | SYSTOLIC BLOOD PRESSURE: 118 MMHG | BODY MASS INDEX: 47.15 KG/M2 | WEIGHT: 283 LBS

## 2021-03-08 DIAGNOSIS — N64.4 BREAST PAIN, LEFT: Primary | ICD-10-CM

## 2021-03-08 PROCEDURE — G8427 DOCREV CUR MEDS BY ELIG CLIN: HCPCS | Performed by: STUDENT IN AN ORGANIZED HEALTH CARE EDUCATION/TRAINING PROGRAM

## 2021-03-08 PROCEDURE — G8399 PT W/DXA RESULTS DOCUMENT: HCPCS | Performed by: STUDENT IN AN ORGANIZED HEALTH CARE EDUCATION/TRAINING PROGRAM

## 2021-03-08 PROCEDURE — G8417 CALC BMI ABV UP PARAM F/U: HCPCS | Performed by: STUDENT IN AN ORGANIZED HEALTH CARE EDUCATION/TRAINING PROGRAM

## 2021-03-08 PROCEDURE — 1123F ACP DISCUSS/DSCN MKR DOCD: CPT | Performed by: STUDENT IN AN ORGANIZED HEALTH CARE EDUCATION/TRAINING PROGRAM

## 2021-03-08 PROCEDURE — G8484 FLU IMMUNIZE NO ADMIN: HCPCS | Performed by: STUDENT IN AN ORGANIZED HEALTH CARE EDUCATION/TRAINING PROGRAM

## 2021-03-08 PROCEDURE — 1036F TOBACCO NON-USER: CPT | Performed by: STUDENT IN AN ORGANIZED HEALTH CARE EDUCATION/TRAINING PROGRAM

## 2021-03-08 PROCEDURE — 1090F PRES/ABSN URINE INCON ASSESS: CPT | Performed by: STUDENT IN AN ORGANIZED HEALTH CARE EDUCATION/TRAINING PROGRAM

## 2021-03-08 PROCEDURE — 3017F COLORECTAL CA SCREEN DOC REV: CPT | Performed by: STUDENT IN AN ORGANIZED HEALTH CARE EDUCATION/TRAINING PROGRAM

## 2021-03-08 PROCEDURE — 4040F PNEUMOC VAC/ADMIN/RCVD: CPT | Performed by: STUDENT IN AN ORGANIZED HEALTH CARE EDUCATION/TRAINING PROGRAM

## 2021-03-08 PROCEDURE — 99213 OFFICE O/P EST LOW 20 MIN: CPT | Performed by: STUDENT IN AN ORGANIZED HEALTH CARE EDUCATION/TRAINING PROGRAM

## 2021-03-08 RX ORDER — ALLOPURINOL 100 MG/1
TABLET ORAL
COMMUNITY
Start: 2021-03-05

## 2021-03-08 RX ORDER — UBIDECARENONE 50 MG
CAPSULE ORAL
COMMUNITY

## 2021-03-08 RX ORDER — METOPROLOL TARTRATE 100 MG/1
TABLET ORAL
COMMUNITY
Start: 2020-12-23 | End: 2021-03-08 | Stop reason: SDUPTHER

## 2021-03-08 RX ORDER — ATORVASTATIN CALCIUM 10 MG/1
TABLET, FILM COATED ORAL
COMMUNITY
Start: 2020-12-23 | End: 2021-03-08 | Stop reason: SDUPTHER

## 2021-03-08 RX ORDER — AMIODARONE HYDROCHLORIDE 200 MG/1
TABLET ORAL
COMMUNITY
Start: 2021-01-28 | End: 2021-03-08 | Stop reason: SDUPTHER

## 2021-03-08 NOTE — PROGRESS NOTES
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 female  No LMP recorded. Patient is postmenopausal.    The patient was seen and examined. She is here for left breast pain. The pain began about 1 month ago and originally, the patient thought that this could be due to a pulled muscle. However, the pain has persisted. She denies any discoloration of the nipple or any abnormal nipple discharge. She has decreased her caffeine, alcohol and chocolate consumption which has improved the pain but the pain is still present at baseline. She has not tried Tylenol or Motrin for discomfort. Patient had a screening mammogram in  which was BI-RADS Category 1. We will collect a diagnostic mammogram on the left.     REVIEW OF SYSTEMS:  Constitutional: negative fever, negative chills  HEENT: negative visual disturbances, negative headaches  Respiratory: negative dyspnea, negative cough  Cardiovascular: negative chest pain,  negative palpitations  Gastrointestinal: negative abdominal pain, negative RUQ pain, negative N/V, negative diarrhea, negative constipation  Genitourinary: negative dysuria, negative vaginal discharge  Dermatological: negative rash, left breast pain  Hematologic: negative bruising  Immunologic/Lymphatic: negative recent illness, negative recent sick contact  Musculoskeletal: negative back pain, negative myalgias, negative arthralgias  Neurological:  negative dizziness, negative weakness  Behavior/Psych: negative depression, negative anxiety    OBSTETRICAL HISTORY:  OB History    Para Term  AB Living   2 2 2 0 0 2   SAB TAB Ectopic Molar Multiple Live Births   0 0 0   0 2 # Outcome Date GA Lbr Branden/2nd Weight Sex Delivery Anes PTL Lv   2 Term 10/1980   8 lb 14 oz (4.026 kg) M Vag-Spont   ASHA   1 Term 02/1979   7 lb 14 oz (3.572 kg) M Vag-Spont   ASHA       PAST MEDICAL HISTORY:      Diagnosis Date    Abnormal EKG     Abnormal Pap smear     ASCUS 5/06    AI (aortic insufficiency)     Arthritis     general    Ascending aortic aneurysm ([address] 12/06/2013    repair    Atrial fibrillation ([address]     Blood type AB+     CHF (congestive heart failure) ([address]     CPAP use counseling     Dyspnea     HSV (herpes simplex virus) infection     Hyperlipidemia     Hypertension     Knee injury 2013    left    MR (mitral regurgitation) 12/06/2013    repair    Pulmonary hypertension ([address]     Thickened endometrium     Tricuspid regurgitation     Wears glasses     READING       PAST SURGICAL HISTORY:                                                                    Procedure Laterality Date    ABDOMINAL AORTIC ANEURYSM REPAIR      with mitral valve repair    BREAST BIOPSY Left 7/25/2012    Benign fat necrosis    CARDIAC CATHETERIZATION  AUG.  2013    CERVIX SURGERY  4/14/95    conization - cystic endocervicitis    COLONOSCOPY      normal    COLONOSCOPY  04/20/2011    normal (/ NWO)    DILATION AND CURETTAGE  2/86    DILATION AND CURETTAGE OF UTERUS  7/08    FOOT SURGERY Bilateral     TOE NAILS REMOVED BIG AND SMALL TOES ONLY    HYSTEROSCOPY  06/10/2014    with D and C    MITRAL VALVE SURGERY  12-6-13    SEPTOPLASTY  06/23/2017    and turbinate reduction    SEPTOPLASTY Bilateral 6/23/2017    SEPTOPLASTY, BILATERAL TURBINATE REDUCTION performed by Meño Hwang MD at [facility]  12-6-13    TRANSESOPHAGEAL ECHOCARDIOGRAM      TUBAL LIGATION  10/05/80    VARICOSE VEIN SURGERY Bilateral 6/2008       MEDICATIONS:  Current Outpatient Medications   Medication Sig Dispense Refill    allopurinol (ZYLOPRIM) 100 MG tablet take 1 tablet by mouth once daily      Coenzyme Q10 50 MG CAPS CoQ-10 50 MG Oral Capsule   Refills: 0    Active      ELIQUIS 5 MG TABS tablet take 1 tablet by mouth twice a day      metoprolol succinate (TOPROL XL) 100 MG extended release tablet Take 1 tablet by mouth 2 times daily      spironolactone (ALDACTONE) 50 MG tablet Take 50 mg by mouth daily      calcium carbonate (CALCIUM 600) 600 MG TABS tablet Take 1 tablet by mouth 2 times daily      Multiple Vitamins-Minerals (MULTIVITAMIN ADULT PO) Take by mouth daily      atorvastatin (LIPITOR) 20 MG tablet Take 20 mg by mouth daily      vitamin E 1000 UNITS capsule Take 400 Units by mouth 2 times daily       amiodarone (PACERONE) 100 MG tablet Take 100 mg by mouth daily       Ascorbic Acid (VITAMIN C) 250 MG tablet Take 500 mg by mouth 2 times daily       furosemide (LASIX) 40 MG tablet Take 1 tablet by mouth 2 times daily. (Patient taking differently: Take 20 mg by mouth 2 times daily ) 60 tablet 3    Cholecalciferol (VITAMIN D3) 2000 UNITS CAPS Take 1 tablet by mouth daily.  FISH OIL Take 1,000 mg by mouth daily       aspirin 81 MG EC tablet Take 81 mg by mouth daily STOPPED TAKING 06/02/2017 FOR SURGERY      Misc. Devices MISC 1 each by Does not apply route once as needed (with urination) 1 Device 0     No current facility-administered medications for this visit.         ALLERGIES:   Allergies as of 03/08/2021 - Review Complete 03/08/2021   Allergen Reaction Noted    Norvasc [amlodipine] Other (See Comments) 01/28/2017    Lisinopril Other (See Comments) 08/08/2013    Sulfa antibiotics Itching 01/29/2017    Sulfasalazine Itching 02/08/2017                                   VITALS:  Vitals:    03/08/21 1410   BP: 118/80   Site: Right Upper Arm   Position: Sitting   Cuff Size: Large Adult   Temp: 98.6 °F (37 °C)   Weight: 283 lb (128.4 kg)   Height: 5' 5\" (1.651 m) PHYSICAL EXAM:   General Appearance: Appears healthy. Alert; in no acute distress. Pleasant. Skin: Skin color, texture, turgor normal. No rashes or lesions. HEENT: normocephalic and atraumatic, Thyroid normal to inspection and palpation  Respiratory: Normal expansion. Clear to auscultation. No rales, rhonchi, or wheezing. Cardiovascular: normal rate, normal S1 and S2, no gallops, intact distal pulses and no carotid bruits  Breast:  (Chest): left breast normal appearance, no masses or tenderness, no nipple discharge  Abdomen: soft, non-tender, non-distended, no right upper quadrant tenderness and no CVA tenderness  Pelvic Exam: Not indicated  Musculoskeletal: no gross abnormalities  Extremities: non-tender BLE and non-edematous  Psych:  oriented to time, place and person       DATA:  No results found for this visit on 21. ASSESSMENT & PLAN:    Coleen Hillman is a 73 y.o. female  No LMP recorded.  Patient is postmenopausal.    Left breast pain   - Diagnostic Mammogram for left breast ordered    Patient Active Problem List    Diagnosis Date Noted    Ascending aortic aneurysm [facility])      Priority: High    Aortic valve insufficiency      Priority: High    Congestive heart failure (HCC)      Priority: Medium    Mitral valve insufficiency      Priority: Medium    Body mass index (BMI) of 45.0-49.9 in adult ([facility] 2012     Priority: Medium    Bruit of right carotid artery 2017    PAD (peripheral artery disease) ([facility] 2017    Lymphedema of both lower extremities 2016    Thrombophlebitis of superficial veins of left lower extremity 2016    Venous insufficiency (chronic) (peripheral) 2016    Adiposity 2016    Dyslipidemia 2016    Right upper quadrant pain 2016    Ptosis of abdominal skin 2015    Thickened endometrium 2014    Atrial fibrillation ([facility]  Disorder of aorta ([facility]     Hypertension 06/13/2012    Hypercholesterolemia 06/13/2012       Return in about 3 months (around 6/8/2021) for Annual.    Counseling Completed:    Discussed need for repeat pap as per American Society for Colposcopy and Cervical Pathology guidelines. Discussed need for mammograms every 1 year, If >42 yo and last mammogram was negative. Discussed Calcium and Vitamin D dosing. Discussed need for colonoscopy screening as well as onset for bone density testing. Discussed birth control and barrier recommendations. Discussed STD counseling and prevention. Discussed Gardisil counseling for all patients 10-35 yo. Hereditary Breast, Ovarian, Colon and Uterine Cancer screening discussed. Tobacco & Secondary smoke risks discussed; with recommendation for cessation and avoidance. Routine health maintenance per patients PCP discussed. Patient was seen with total face to face time of 15 minutes. More than 50% of this visit was on counseling and education regarding her diagnose(s) as listed below and options. She was also counseled on her preventative health maintenance recommendations and follow-up. Diagnosis Orders   1.  Breast pain, left  [facility]RET DIGITAL DIAGNOSTIC W OR WO CAD LEFT         [facility]ina [facility] [facility], [address]  3/8/2021, 2:34 PM

## 2021-03-23 ENCOUNTER — HOSPITAL ENCOUNTER (OUTPATIENT)
Dept: MAMMOGRAPHY | Age: 66
Discharge: HOME | End: 2021-03-25
Payer: COMMERCIAL

## 2021-03-23 ENCOUNTER — HOSPITAL ENCOUNTER (OUTPATIENT)
Dept: ULTRASOUND IMAGING | Age: 66
Discharge: HOME | End: 2021-03-25
Payer: COMMERCIAL

## 2021-03-23 DIAGNOSIS — R92.8 ABNORMAL MAMMOGRAM: ICD-10-CM

## 2021-03-23 DIAGNOSIS — N64.4 BREAST PAIN, LEFT: ICD-10-CM

## 2021-03-23 DIAGNOSIS — Z12.31 ENCOUNTER FOR SCREENING MAMMOGRAM FOR BREAST CANCER: ICD-10-CM

## 2021-03-23 PROCEDURE — G0279 TOMOSYNTHESIS, MAMMO: HCPCS

## 2021-03-23 PROCEDURE — 76642 ULTRASOUND BREAST LIMITED: CPT

## 2021-05-25 DIAGNOSIS — Z12.31 ENCOUNTER FOR SCREENING MAMMOGRAM FOR BREAST CANCER: Primary | ICD-10-CM

## 2021-06-03 ENCOUNTER — OFFICE VISIT (OUTPATIENT)
Dept: OBGYN CLINIC | Age: 66
End: 2021-06-03
Payer: COMMERCIAL

## 2021-06-03 ENCOUNTER — HOSPITAL ENCOUNTER (OUTPATIENT)
Dept: MAMMOGRAPHY | Age: 66
Discharge: HOME | End: 2021-06-05
Payer: COMMERCIAL

## 2021-06-03 VITALS
BODY MASS INDEX: 44.82 KG/M2 | SYSTOLIC BLOOD PRESSURE: 118 MMHG | WEIGHT: 269 LBS | DIASTOLIC BLOOD PRESSURE: 78 MMHG | HEIGHT: 65 IN

## 2021-06-03 DIAGNOSIS — Z12.31 ENCOUNTER FOR SCREENING MAMMOGRAM FOR BREAST CANCER: ICD-10-CM

## 2021-06-03 DIAGNOSIS — N95.1 MENOPAUSAL STATE: ICD-10-CM

## 2021-06-03 DIAGNOSIS — Z01.419 WOMEN'S ANNUAL ROUTINE GYNECOLOGICAL EXAMINATION: Primary | ICD-10-CM

## 2021-06-03 PROCEDURE — 99397 PER PM REEVAL EST PAT 65+ YR: CPT | Performed by: STUDENT IN AN ORGANIZED HEALTH CARE EDUCATION/TRAINING PROGRAM

## 2021-06-03 PROCEDURE — 77063 BREAST TOMOSYNTHESIS BI: CPT

## 2021-06-03 RX ORDER — AMIODARONE HYDROCHLORIDE 200 MG/1
TABLET ORAL
COMMUNITY
Start: 2021-05-13 | End: 2021-06-03 | Stop reason: SDUPTHER

## 2021-06-03 NOTE — PROGRESS NOTES
[facility]   [address]  [facility], [address]      Sarahi Tariq  6/3/2021                       Primary Care Physician: Akshat Almonte DO    CC:   Chief Complaint   Patient presents with    Annual Exam     20 ASCUS HPV-, graham 3-23-21 Bi-rads 1          HPI: Sarahi Tariq is a 73 y.o. female  No LMP recorded. Patient is postmenopausal.    The patient was seen and examined. She is here for an annual visit. She is complaining of nothing. Patient has been menopausal for many years and denies any bleeding since that time. Her bowel habits are regular. She denies any bloating. She denies dysuria. She denies urinary leaking. She denies vaginal discharge. She is not sexually active. She uses bilateral tubal ligation for contraception and is not desiring pregnancy.     Depression Screen: Symptoms of decreased mood absent  Symptoms of anhedonia absent  **If either question is answered in a  positive fashion then complete the PHQ9 Scoring Evaluation and make the appropriate referral**    REVIEW OF SYSTEMS:   Constitutional: negative fever, negative chills  HEENT: negative visual disturbances, negative headaches  Respiratory: negative dyspnea, negative cough  Cardiovascular: negative chest pain,  negative palpitations  Gastrointestinal: negative abdominal pain, negative RUQ pain, negative N/V, negative diarrhea, negative constipation  Genitourinary: negative dysuria, negative vaginal discharge  Dermatological: negative rash  Hematologic: negative bruising  Immunologic/Lymphatic: negative recent illness, negative recent sick contact  Musculoskeletal: negative back pain, negative myalgias, negative arthralgias  Neurological:  negative dizziness, negative weakness  Behavior/Psych: negative depression, negative anxiety      GYNECOLOGICAL HISTORY:  Age of Menarche: 16  Age of Menopause: 47     STD History: no past history    Pap History: Last PAP was abnormal;  May/2020 - ASC-US; Atypical Squamous Cells of Undetermined Significance. Neg HPV    Permanent Sterilization: yes - tubal ligation  Reversible Birth Control: no  Hormone Replacement Exposure: no    OBSTETRICAL HISTORY:  OB History    Para Term  AB Living   2 2 2 0 0 2   SAB TAB Ectopic Molar Multiple Live Births   0 0 0 0 0 2      # Outcome Date GA Lbr Branden/2nd Weight Sex Delivery Anes PTL Lv   2 Term 10/1980   8 lb 14 oz (4.026 kg) M Vag-Spont   ASHA   1 Term 1979   7 lb 14 oz (3.572 kg) M Vag-Spont   ASHA       PAST MEDICAL HISTORY:   has a past medical history of Abnormal EKG, Abnormal Pap smear, AI (aortic insufficiency), Arthritis, Ascending aortic aneurysm ([address] Atrial fibrillation ([address] Blood type AB+, CHF (congestive heart failure) ([address] CPAP use counseling, Dyspnea, HSV (herpes simplex virus) infection, Hyperlipidemia, Hypertension, Knee injury, MR (mitral regurgitation), Pulmonary hypertension ([address] Thickened endometrium, Tricuspid regurgitation, and Wears glasses. PAST SURGICAL HISTORY:   has a past surgical history that includes Tubal ligation (10/05/80); Dilation & curettage (); Foot surgery (Bilateral); Dilation and curettage of uterus (); Varicose vein surgery (Bilateral, 2008); Cervix surgery (95); Breast biopsy (Left, 2012); Cardiac catheterization (AUG. 2013); transesophageal echocardiogram; Mitral valve surgery (13); Thoracic aortic aneurysm repair (13); Colonoscopy; hysteroscopy (06/10/2014); Abdominal aortic aneurysm repair; Septoplasty (2017); Septoplasty (Bilateral, 2017); and Colonoscopy (2011). ALLERGIES:  is allergic to norvasc [amlodipine], lisinopril, sulfa antibiotics, and sulfasalazine. MEDICATIONS:  Prior to Admission medications    Medication Sig Start Date End Date Taking?  Authorizing Provider   allopurinol (ZYLOPRIM) 100 MG tablet take 1 tablet by mouth once daily 3/5/21  Yes Historical Provider, MD   Coenzyme Q10 50 MG CAPS CoQ-10 50 MG Oral Capsule   Refills: 0    Active   Yes Historical Provider, MD   ELIQUIS 5 MG TABS tablet take 1 tablet by mouth twice a day 5/11/20  Yes Historical Provider, MD   metoprolol succinate (TOPROL XL) 100 MG extended release tablet Take 1 tablet by mouth 2 times daily   Yes Historical Provider, MD   spironolactone (ALDACTONE) 50 MG tablet Take 50 mg by mouth daily   Yes Historical Provider, MD   calcium carbonate (CALCIUM 600) 600 MG TABS tablet Take 1 tablet by mouth 2 times daily   Yes Historical Provider, MD   Multiple Vitamins-Minerals (MULTIVITAMIN ADULT PO) Take by mouth daily   Yes Historical Provider, MD   atorvastatin (LIPITOR) 20 MG tablet Take 20 mg by mouth daily   Yes Historical Provider, MD   vitamin E 1000 UNITS capsule Take 400 Units by mouth 2 times daily    Yes Historical Provider, MD   amiodarone (PACERONE) 100 MG tablet Take 100 mg by mouth daily    Yes Historical Provider, MD   Ascorbic Acid (VITAMIN C) 250 MG tablet Take 500 mg by mouth 2 times daily    Yes Historical Provider, MD   furosemide (LASIX) 40 MG tablet Take 1 tablet by mouth 2 times daily. Patient taking differently: Take 20 mg by mouth 2 times daily  12/13/13  Yes Leona Diallo PA-C   Cholecalciferol (VITAMIN D3) 2000 UNITS CAPS Take 1 tablet by mouth daily. Yes Historical Provider, MD   FISH OIL Take 1,000 mg by mouth daily    Yes Historical Provider, MD   aspirin 81 MG EC tablet Take 81 mg by mouth daily STOPPED TAKING 06/02/2017 FOR SURGERY   Yes Historical Provider, MD   Misc. Devices MISC 1 each by Does not apply route once as needed (with urination) 7/18/19 7/18/19  Aidee Charles MD       FAMILY HISTORY:  Family History of Breast, Ovarian, Colon or Uterine Cancer: No   family history includes Cancer in her paternal grandmother and sister; Diabetes in her father, mother, and sister;  Heart Attack in her father; High Blood Pressure in her brother, brother, father, sister, sister, sister, and sister; Hypertension in her mother, sister, and sister; Kidney Disease in her mother; Rashes/Skin Problems in her sister. SOCIAL HISTORY:   reports that she has never smoked. She has never used smokeless tobacco. She reports current alcohol use of about 1.0 standard drinks of alcohol per week. She reports that she does not use drugs. HEALTH MAINTENANCE:  Immunization status: stated as up to date, no records available    [address] [place] [address]: BIRADS 1 6/2020. Next due now. Colonoscopy: Done in 2020 per patient but no records available. Due in 2023. Pap Smears: Last 6/2020 ASCUS with Neg HPV. Can repeat at 80 and if neg, can discontinue per ASCCP guidelines. Family Planning: Menopausal, tubal ligation  DEXA: 2016 Osteopenia. Repeat due now. VITALS:  Vitals:    06/03/21 1304   BP: 118/78   Site: Left Upper Arm   Position: Sitting   Cuff Size: Large Adult   Weight: 269 lb (122 kg)   Height: 5' 5\" (1.651 m)                                                                                                                                                                                                      PHYSICAL EXAM:   General Appearance: Appears healthy. Alert; in no acute distress. Pleasant. Skin: Skin color, texture, turgor normal. No rashes or lesions. HEENT: normocephalic and atraumatic, Thyroid normal to inspection and palpation  Respiratory: Normal expansion. Clear to auscultation. No rales, rhonchi, or wheezing.   Cardiovascular: normal rate, normal S1 and S2, no gallops, intact distal pulses and no carotid bruits  Breast:  (Chest): normal appearance, no masses or tenderness  Abdomen: soft, non-tender, non-distended, no right upper quadrant tenderness and no CVA tenderness  Pelvic Exam:   External genitalia: General appearance; normal, Hair distribution; normal, Lesions absent  Urinary system: urethral meatus normal  Vaginal: normal mucosa, no discharge  Cervix: normal appearing cervix without discharge or lesions  Adnexa: non-palpable  Uterus: normal single, nontender  Rectal Exam: exam declined by patient  Musculoskeletal: no gross abnormalities  Extremities: non-tender BLE and non-edematous  Psych:  oriented to time, place and person     DATA:  No results found for this visit on 21. ASSESSMENT & PLAN:    Jevon Hines is a 73 y.o. female  No LMP recorded. Patient is postmenopausal.    Annual:   Mammogram: BIRADS 1 2020. Next due now. Colonoscopy: Done in  per patient but no records available. Due in . Pap Smears: Last 2020 ASCUS with Neg HPV. Can repeat at 80 and if neg, can discontinue per ASCCP guidelines. Family Planning: Menopausal, tubal ligation   DEXA:  Osteopenia. Repeat due now. Patient Active Problem List    Diagnosis Date Noted    Ascending aortic aneurysm [facility])      Priority: High    Aortic valve insufficiency      Priority: High    Congestive heart failure (HCC)      Priority: Medium    Mitral valve insufficiency      Priority: Medium    Body mass index (BMI) of 45.0-49.9 in adult [facility]) 2012     Priority: Medium    Bruit of right carotid artery 2017    PAD (peripheral artery disease) (HCC) 2017    Lymphedema of both lower extremities 2016    Thrombophlebitis of superficial veins of left lower extremity 2016    Venous insufficiency (chronic) (peripheral) 2016    Adiposity 2016    Dyslipidemia 2016    Right upper quadrant pain 2016    Ptosis of abdominal skin 2015    Thickened endometrium 2014    Atrial fibrillation (HCC)     Disorder of aorta ([facility])     Hypertension 2012    Hypercholesterolemia 2012       Return in about 1 year (around 6/3/2022) for Annual.  No Patient Care Coordination Note on file. Counseling Completed:    Discussed need for repeat pap as per American Society for Colposcopy and Cervical Pathology guidelines.   Discussed need for mammograms every 1 year, If >42 yo and last mammogram was negative. Discussed Calcium and Vitamin D dosing. Discussed need for colonoscopy screening as well as onset for bone density testing. Discussed birth control and barrier recommendations. Discussed STD counseling and prevention. Discussed Gardisil counseling for all patients 10-37 yo. Hereditary Breast, Ovarian, Colon and Uterine Cancer screening discussed. Tobacco & Secondary smoke risks discussed; with recommendation for cessation and avoidance. Routine health maintenance per patients PCP discussed. Diagnosis Orders   1. Women's annual routine gynecological examination     2. Encounter for screening mammogram for breast cancer     3.  Menopausal state  DEXA BONE DENSITY 2 SITES        [facility] [facility], [id number]  6/3/2021, 1:13 PM

## 2021-09-27 ENCOUNTER — HOSPITAL ENCOUNTER (OUTPATIENT)
Dept: MAMMOGRAPHY | Age: 66
Discharge: HOME | End: 2021-09-29
Payer: COMMERCIAL

## 2021-09-27 DIAGNOSIS — N95.1 MENOPAUSAL STATE: ICD-10-CM

## 2021-09-27 PROCEDURE — 77080 DXA BONE DENSITY AXIAL: CPT

## 2022-04-27 ENCOUNTER — HOSPITAL ENCOUNTER (OUTPATIENT)
Dept: MRI IMAGING | Age: 67
Discharge: HOME | End: 2022-04-29
Payer: COMMERCIAL

## 2022-04-27 DIAGNOSIS — H93.13 TINNITUS OF BOTH EARS: ICD-10-CM

## 2022-04-27 DIAGNOSIS — H90.41 SNSRNRL HEAR LOSS, UNI, RIGHT EAR, W UNRESTR HEAR CNTRA SIDE: ICD-10-CM

## 2022-04-27 DIAGNOSIS — D33.3 BENIGN NEOPLASM OF CRANIAL NERVES (HCC): ICD-10-CM

## 2022-04-27 LAB
CREAT SERPL-MCNC: 0.98 MG/DL (ref 0.5–0.9)
GFR AFRICAN AMERICAN: >60 ML/MIN
GFR NON-AFRICAN AMERICAN: 57 ML/MIN
GFR SERPL CREATININE-BSD FRML MDRD: ABNORMAL ML/MIN/{1.73_M2}

## 2022-04-27 PROCEDURE — 82565 ASSAY OF CREATININE: CPT

## 2022-04-27 PROCEDURE — 36415 COLL VENOUS BLD VENIPUNCTURE: CPT

## 2022-04-27 PROCEDURE — A9579 GAD-BASE MR CONTRAST NOS,1ML: HCPCS | Performed by: NURSE PRACTITIONER

## 2022-04-27 PROCEDURE — 70553 MRI BRAIN STEM W/O & W/DYE: CPT

## 2022-04-27 PROCEDURE — 6360000004 HC RX CONTRAST MEDICATION: Performed by: NURSE PRACTITIONER

## 2022-04-27 RX ADMIN — GADOTERIDOL 20 ML: 279.3 INJECTION, SOLUTION INTRAVENOUS at 15:33

## 2022-06-03 RX ORDER — AMIODARONE HYDROCHLORIDE 200 MG/1
TABLET ORAL
COMMUNITY
Start: 2022-02-28

## 2022-06-03 RX ORDER — APIXABAN 2.5 MG/1
2.5 TABLET, FILM COATED ORAL 2 TIMES DAILY
COMMUNITY
Start: 2022-02-28

## 2022-06-03 RX ORDER — METOPROLOL TARTRATE 50 MG/1
TABLET, FILM COATED ORAL
COMMUNITY
Start: 2022-04-09

## 2022-06-03 RX ORDER — LEVOTHYROXINE SODIUM 0.03 MG/1
TABLET ORAL
COMMUNITY
Start: 2022-02-26

## 2022-06-03 NOTE — PROGRESS NOTES
[address] [facility]  [address] [facility], [address] [place] [address]      Joceline Joe  2022                       Primary Care Physician: Lieutenant Philippe DO    CC:   Chief Complaint   Patient presents with    Annual Exam     p 20 ASCUS HPV- m 3-23-21 Bi-rads 1 dexa 21 osteopenia          HPI: Joceline Joe is a 78 y.o. female  No LMP recorded. Patient is postmenopausal.    The patient was seen and examined. She is here for an annual visit. She is complaining of nothing. Patient is menopausal and denies any bleeding since that time. Her bowel habits are regular. She denies any bloating. She denies dysuria. She denies urinary leaking. She denies vaginal discharge. She is not sexually active.     Depression Screen: Symptoms of decreased mood absent  Symptoms of anhedonia absent  **If either question is answered in a  positive fashion then complete the PHQ9 Scoring Evaluation and make the appropriate referral**    REVIEW OF SYSTEMS:   Constitutional: negative fever, negative chills  HEENT: negative visual disturbances, negative headaches  Respiratory: negative dyspnea, negative cough  Cardiovascular: negative chest pain,  negative palpitations  Gastrointestinal: negative abdominal pain, negative RUQ pain, negative N/V, negative diarrhea, negative constipation  Genitourinary: negative dysuria, negative vaginal discharge  Dermatological: negative rash  Hematologic: negative bruising  Immunologic/Lymphatic: negative recent illness, negative recent sick contact  Musculoskeletal: negative back pain, negative myalgias, negative arthralgias  Neurological:  negative dizziness, negative weakness  Behavior/Psych: negative depression, negative anxiety      GYNECOLOGICAL HISTORY:  Age of Menarche: 16  Age of Menopause: 47     STD History: no past history    Permanent Sterilization: yes - tubal ligation  Reversible Birth Control: no  Hormone Replacement Exposure: no    OBSTETRICAL HISTORY:  OB History    Para Term  AB Living   2 2 2 0 0 2   SAB IAB Ectopic Molar Multiple Live Births   0 0 0 0 0 2      # Outcome Date GA Lbr Branden/2nd Weight Sex Delivery Anes PTL Lv   2 Term 10/1980   8 lb 14 oz (4.026 kg) M Vag-Spont   ASHA   1 Term 1979   7 lb 14 oz (3.572 kg) M Vag-Spont   ASHA       PAST MEDICAL HISTORY:   has a past medical history of Abnormal EKG, Abnormal Pap smear, AI (aortic insufficiency), Arthritis, Ascending aortic aneurysm ([address] Atrial fibrillation ([address] Blood type AB+, CHF (congestive heart failure) ([address] CPAP use counseling, Dyspnea, HSV (herpes simplex virus) infection, Hyperlipidemia, Hypertension, Knee injury, MR (mitral regurgitation), Pulmonary hypertension ([address] Thickened endometrium, Tricuspid regurgitation, and Wears glasses. PAST SURGICAL HISTORY:   has a past surgical history that includes Tubal ligation (10/05/80); Dilation & curettage (); Foot surgery (Bilateral); Dilation and curettage of uterus (); Varicose vein surgery (Bilateral, 2008); Cervix surgery (95); Breast biopsy (Left, 2012); Cardiac catheterization (AUG. 2013); transesophageal echocardiogram; Mitral valve surgery (13); Thoracic aortic aneurysm repair (13); Colonoscopy; hysteroscopy (06/10/2014); Abdominal aortic aneurysm repair; Septoplasty (2017); Septoplasty (Bilateral, 2017); and Colonoscopy (2011). ALLERGIES:  is allergic to norvasc [amlodipine], lisinopril, sulfa antibiotics, and sulfasalazine. MEDICATIONS:  Prior to Admission medications    Medication Sig Start Date End Date Taking?  Authorizing Provider   levothyroxine (SYNTHROID) 25 MCG tablet take 1 tablet by mouth once daily 22  Yes Historical Provider, MD   metoprolol tartrate (LOPRESSOR) 50 MG tablet take 1 tablet by mouth twice a day 22  Yes Historical Provider, MD   ELIQUISI 2.5 MG TABS tablet 2.5 mg in the morning and at bedtime  22  Yes Historical Provider, MD   Coenzyme Q10 50 MG CAPS CoQ-10 50 MG Oral Capsule   Refills: 0    Active   Yes Historical Provider, MD   metoprolol succinate (TOPROL XL) 100 MG extended release tablet Take 1 tablet by mouth 2 times daily   Yes Historical Provider, MD   spironolactone (ALDACTONE) 50 MG tablet Take 50 mg by mouth daily   Yes Historical Provider, MD   calcium carbonate (CALCIUM 600) 600 MG TABS tablet Take 1 tablet by mouth 2 times daily   Yes Historical Provider, MD   Multiple Vitamins-Minerals (MULTIVITAMIN ADULT PO) Take by mouth daily   Yes Historical Provider, MD   atorvastatin (LIPITOR) 20 MG tablet Take 20 mg by mouth daily   Yes Historical Provider, MD   vitamin E 1000 UNITS capsule Take 400 Units by mouth 2 times daily    Yes Historical Provider, MD   Ascorbic Acid (VITAMIN C) 250 MG tablet Take 500 mg by mouth 2 times daily    Yes Historical Provider, MD   furosemide (LASIX) 40 MG tablet Take 1 tablet by mouth 2 times daily. Patient taking differently: Take 20 mg by mouth 2 times daily  12/13/13  Yes Leona Diallo PA-C   Cholecalciferol (VITAMIN D3) 2000 UNITS CAPS Take 1 tablet by mouth daily. Yes Historical Provider, MD   FISH OIL Take 1,000 mg by mouth daily    Yes Historical Provider, MD   amiodarone (CORDARONE) 200 MG tablet take 1/2 tablet by mouth once daily  Patient not taking: Reported on 6/6/2022 2/28/22   Historical Provider, MD   allopurinol (ZYLOPRIM) 100 MG tablet take 1 tablet by mouth once daily  Patient not taking: Reported on 6/6/2022 3/5/21   Historical Provider, MD   Misc.  Devices MISC 1 each by Does not apply route once as needed (with urination) 7/18/19 7/18/19  Whit Syed MD   amiodarone (PACERONE) 100 MG tablet Take 100 mg by mouth daily   Patient not taking: Reported on 6/6/2022    Historical Provider, MD   aspirin 81 MG EC tablet Take 81 mg by mouth daily STOPPED TAKING 06/02/2017 FOR SURGERY  Patient not taking: Reported on 6/6/2022    Historical Provider, MD       FAMILY HISTORY:  Family History of Breast, Ovarian, Colon or Uterine Cancer: No   family history includes Cancer in her paternal grandmother; Diabetes in her father, mother, and sister; Heart Attack in her father; High Blood Pressure in her brother, brother, father, sister, sister, sister, and sister; Hypertension in her mother, sister, and sister; Kidney Disease in her mother; Pancreatic Cancer in her sister; Rashes/Skin Problems in her sister. SOCIAL HISTORY:   reports that she has never smoked. She has never used smokeless tobacco. She reports current alcohol use of about 1.0 standard drink of alcohol per week. She reports that she does not use drugs. HEALTH MAINTENANCE:  Immunization status: stated as up to date, no records available    [address] [place] [address]: BIRADS 1 6/21. Next due 6/22. Colonoscopy: 2020 neg. Per patient, due in 2023. Pap Smears: Last 6/20 ASCUS w/ Neg HPV. Next due 6/23. Family Planning: Menopausal, Tubal Ligation  DEXA: 9/21 Osteopenia. Due 9/23. VITALS:  Vitals:    06/06/22 1315   BP: 108/66   Site: Right Upper Arm   Position: Sitting   Cuff Size: Large Adult   Pulse: 62   Weight: 267 lb 9.6 oz (121.4 kg)                                                                                                                                                                                                        PHYSICAL EXAM:   General Appearance: Appears healthy. Alert; in no acute distress. Pleasant. Skin: Skin color, texture, turgor normal. No rashes or lesions. HEENT: normocephalic and atraumatic, Thyroid normal to inspection and palpation  Respiratory: Normal expansion. Clear to auscultation. No rales, rhonchi, or wheezing.   Cardiovascular: normal rate, normal S1 and S2, no gallops, intact distal pulses and no carotid bruits  Breast:  (Chest): normal appearance, no masses or tenderness  Abdomen: soft, non-tender, non-distended, no right upper quadrant tenderness and no CVA tenderness  Pelvic Exam:   External genitalia: General appearance; normal, Hair distribution; normal, Lesions absent  Urinary system: urethral meatus normal  Vaginal: normal mucosa, no discharge  Cervix: normal appearing cervix without discharge or lesions  Adnexa: non-palpable  Uterus: normal single, nontender  Rectal Exam: exam declined by patient  Musculoskeletal: no gross abnormalities  Extremities: non-tender BLE and non-edematous  Psych:  oriented to time, place and person     DATA:  No results found for this visit on 22. ASSESSMENT & PLAN:    Naye Santana is a 78 y.o. female  No LMP recorded. Patient is postmenopausal.    Annual:   Mammogram: BIRADS 1 . Next due . Colonoscopy:  neg. Per patient, due in . Pap Smears: Last  ASCUS w/ Neg HPV. Next due . Family Planning: Menopausal, Tubal Ligation   DEXA:  Osteopenia. Due .     Patient Active Problem List    Diagnosis Date Noted    Ascending aortic aneurysm [facility])      Priority: High    Aortic valve insufficiency      Priority: High    Congestive heart failure (HCC)      Priority: Medium    Mitral valve insufficiency      Priority: Medium    Body mass index (BMI) of 45.0-49.9 in adult [facility]) 2012     Priority: Medium    Bruit of right carotid artery 2017    PAD (peripheral artery disease) (HCC) 2017    Lymphedema of both lower extremities 2016    Thrombophlebitis of superficial veins of left lower extremity 2016    Venous insufficiency (chronic) (peripheral) 2016    Adiposity 2016    Dyslipidemia 2016    Right upper quadrant pain 2016    Ptosis of abdominal skin 2015    Thickened endometrium 2014    Atrial fibrillation (HCC)     Disorder of aorta (HCC)     Hypertension 2012    Hypercholesterolemia 2012       Return in about 1 year (around 2023) for Annual.  No Patient Care

## 2022-06-06 ENCOUNTER — OFFICE VISIT (OUTPATIENT)
Dept: OBGYN CLINIC | Age: 67
End: 2022-06-06
Payer: COMMERCIAL

## 2022-06-06 VITALS
SYSTOLIC BLOOD PRESSURE: 108 MMHG | BODY MASS INDEX: 44.53 KG/M2 | HEART RATE: 62 BPM | DIASTOLIC BLOOD PRESSURE: 66 MMHG | WEIGHT: 267.6 LBS

## 2022-06-06 DIAGNOSIS — Z12.31 ENCOUNTER FOR SCREENING MAMMOGRAM FOR MALIGNANT NEOPLASM OF BREAST: ICD-10-CM

## 2022-06-06 DIAGNOSIS — Z01.419 WOMEN'S ANNUAL ROUTINE GYNECOLOGICAL EXAMINATION: Primary | ICD-10-CM

## 2022-06-06 PROCEDURE — 99397 PER PM REEVAL EST PAT 65+ YR: CPT | Performed by: STUDENT IN AN ORGANIZED HEALTH CARE EDUCATION/TRAINING PROGRAM

## 2022-06-07 ENCOUNTER — HOSPITAL ENCOUNTER (OUTPATIENT)
Dept: MAMMOGRAPHY | Age: 67
Discharge: HOME | End: 2022-06-09
Payer: COMMERCIAL

## 2022-06-07 DIAGNOSIS — Z12.31 ENCOUNTER FOR SCREENING MAMMOGRAM FOR MALIGNANT NEOPLASM OF BREAST: ICD-10-CM

## 2022-06-07 PROCEDURE — 77067 SCR MAMMO BI INCL CAD: CPT

## 2023-05-22 DIAGNOSIS — Z12.31 ENCOUNTER FOR SCREENING MAMMOGRAM FOR MALIGNANT NEOPLASM OF BREAST: Primary | ICD-10-CM

## 2023-08-08 ENCOUNTER — OFFICE VISIT (OUTPATIENT)
Dept: OBGYN CLINIC | Age: 68
End: 2023-08-08
Payer: MEDICARE

## 2023-08-08 ENCOUNTER — HOSPITAL ENCOUNTER (OUTPATIENT)
Age: 68
Setting detail: SPECIMEN
Discharge: HOME | End: 2023-08-08

## 2023-08-08 VITALS
DIASTOLIC BLOOD PRESSURE: 68 MMHG | SYSTOLIC BLOOD PRESSURE: 112 MMHG | HEIGHT: 63 IN | BODY MASS INDEX: 48.9 KG/M2 | WEIGHT: 276 LBS

## 2023-08-08 DIAGNOSIS — Z12.31 ENCOUNTER FOR SCREENING MAMMOGRAM FOR MALIGNANT NEOPLASM OF BREAST: ICD-10-CM

## 2023-08-08 DIAGNOSIS — Z78.0 POSTMENOPAUSAL: ICD-10-CM

## 2023-08-08 DIAGNOSIS — Z01.419 WOMEN'S ANNUAL ROUTINE GYNECOLOGICAL EXAMINATION: Primary | ICD-10-CM

## 2023-08-08 PROCEDURE — G0101 CA SCREEN;PELVIC/BREAST EXAM: HCPCS | Performed by: STUDENT IN AN ORGANIZED HEALTH CARE EDUCATION/TRAINING PROGRAM

## 2023-08-08 NOTE — PROGRESS NOTES
to date and documented    ROUTINE GYN HEALTH MAINTENANCE  Mammogram: BIRADS 1 . Next due now. Colonoscopy:  neg. Per patient, due in . Pap Smears: Last  ASCUS w/ Neg HPV. Next due now. If Neg, will stop due to age. Family Planning: Menopausal. Tubal.  DEXA:  Osteopenia. Due . VITALS:  Vitals:    23 1417   BP: 112/68   Site: Left Upper Arm   Position: Sitting   Cuff Size: Large Adult   Weight: 276 lb (125.2 kg)   Height: 5' 3\" (1.6 m)                                                                                                                                                                                                   PHYSICAL EXAM:   General Appearance: Appears healthy. Alert; in no acute distress. Pleasant. Skin: Skin color, texture, turgor normal. No rashes or lesions. HEENT: normocephalic and atraumatic, Thyroid normal to inspection and palpation  Respiratory: Normal expansion. Clear to auscultation. No rales, rhonchi, or wheezing. Cardiovascular: normal rate, normal S1 and S2, no gallops, intact distal pulses and no carotid bruits  Breast:  (Chest): normal appearance, no masses or tenderness  Abdomen: soft, non-tender, non-distended, no right upper quadrant tenderness and no CVA tenderness  Pelvic Exam:   External genitalia: General appearance; normal, Hair distribution; normal, Lesions absent  Urinary system: urethral meatus normal  Vaginal: normal mucosa, no discharge  Cervix: normal appearing cervix without discharge or lesions, difficult to obtain pap due to body habitus and patient discomfort. Adnexa: non-palpable  Uterus: normal single, nontender  Rectal Exam: exam declined by patient  Musculoskeletal: no gross abnormalities  Extremities: non-tender BLE and non-edematous  Psych:  oriented to time, place and person     DATA:  No results found for this visit on 23. ASSESSMENT & PLAN:    Murtaza Love is a 80 y.o. female  No LMP recorded.

## 2023-08-09 ENCOUNTER — TELEPHONE (OUTPATIENT)
Dept: OBGYN CLINIC | Age: 68
End: 2023-08-09

## 2023-08-09 NOTE — TELEPHONE ENCOUNTER
Problem, gave new card Pt has [employer]    Pt states she felt provider was laughing at her when she was hurting. Pt states she went home, took shower, felt like she had tear in vaginal vault, took Motrin 600 mg and went to bed. Pt states she felt like staff was laughing at her when she walked out of the room. This nurse saw pt exit room and there where no employees in hallways when she walked out. She was in room 4 and it is very close to exit into lobby.

## 2023-08-11 LAB
HPV I/H RISK 4 DNA CVX QL NAA+PROBE: NOT DETECTED
HPV SAMPLE: NORMAL
HPV, INTERPRETATION: NORMAL
HPV16 DNA CVX QL NAA+PROBE: NOT DETECTED
HPV18 DNA CVX QL NAA+PROBE: NOT DETECTED
SPECIMEN DESCRIPTION: NORMAL

## 2023-08-17 LAB — CYTOLOGY REPORT: NORMAL

## 2023-11-15 ENCOUNTER — HOSPITAL ENCOUNTER (OUTPATIENT)
Dept: MAMMOGRAPHY | Age: 68
Discharge: HOME | End: 2023-11-17
Attending: STUDENT IN AN ORGANIZED HEALTH CARE EDUCATION/TRAINING PROGRAM
Payer: MEDICARE

## 2023-11-15 DIAGNOSIS — Z12.31 ENCOUNTER FOR SCREENING MAMMOGRAM FOR MALIGNANT NEOPLASM OF BREAST: ICD-10-CM

## 2023-11-15 DIAGNOSIS — Z78.0 POSTMENOPAUSAL: ICD-10-CM

## 2023-11-15 PROCEDURE — 77080 DXA BONE DENSITY AXIAL: CPT

## 2023-11-15 PROCEDURE — 77063 BREAST TOMOSYNTHESIS BI: CPT

## 2023-12-20 ENCOUNTER — TELEPHONE (OUTPATIENT)
Dept: OBGYN CLINIC | Age: 68
End: 2023-12-20

## 2023-12-20 NOTE — TELEPHONE ENCOUNTER
Morocho Jacobson called in asking to speak to the  and pt would not give me like a reason why she just states she would like to speak with Julio Bryant to discuss what she wants to talk about. I did forward her to [facility] phone to leave a message but pt would like a return call.

## 2023-12-29 NOTE — TELEPHONE ENCOUNTER
Patient called back. She is transferring care to a new physician and wanted her records. Informed her that she would have to complete a records release so we can send that to her new GYN. She stated that she will be coming in to complete a form and we would send to our records department to send to her new GYN.

## 2024-01-29 ENCOUNTER — HOSPITAL ENCOUNTER (OUTPATIENT)
Age: 69
Discharge: HOME | End: 2024-01-29
Payer: MEDICARE

## 2024-01-29 LAB
ALBUMIN SERPL-MCNC: 4.3 G/DL (ref 3.5–5.2)
ALBUMIN/GLOB SERPL: 1 {RATIO} (ref 1–2.5)
ALP SERPL-CCNC: 84 U/L (ref 35–104)
ALT SERPL-CCNC: 14 U/L (ref 10–35)
ANION GAP SERPL CALCULATED.3IONS-SCNC: 8 MMOL/L (ref 9–16)
AST SERPL-CCNC: 29 U/L (ref 10–35)
BILIRUB SERPL-MCNC: 0.6 MG/DL (ref 0–1.2)
BUN SERPL-MCNC: 17 MG/DL (ref 8–23)
CALCIUM SERPL-MCNC: 9.9 MG/DL (ref 8.6–10.4)
CHLORIDE SERPL-SCNC: 104 MMOL/L (ref 98–107)
CHOLEST SERPL-MCNC: 152 MG/DL (ref 0–199)
CHOLESTEROL/HDL RATIO: 3
CO2 SERPL-SCNC: 29 MMOL/L (ref 20–31)
CREAT SERPL-MCNC: 0.9 MG/DL (ref 0.5–0.9)
ERYTHROCYTE [DISTWIDTH] IN BLOOD BY AUTOMATED COUNT: 13.1 % (ref 11.8–14.4)
EST. AVERAGE GLUCOSE BLD GHB EST-MCNC: 120 MG/DL
GFR SERPL CREATININE-BSD FRML MDRD: >60 ML/MIN/1.73M2
GLUCOSE SERPL-MCNC: 89 MG/DL (ref 74–99)
HBA1C MFR BLD: 5.8 % (ref 4–6)
HCT VFR BLD AUTO: 45.5 % (ref 36.3–47.1)
HDLC SERPL-MCNC: 50 MG/DL
HGB BLD-MCNC: 14.9 G/DL (ref 11.9–15.1)
LDLC SERPL CALC-MCNC: 79 MG/DL (ref 0–100)
MCH RBC QN AUTO: 31 PG (ref 25.2–33.5)
MCHC RBC AUTO-ENTMCNC: 32.7 G/DL (ref 28.4–34.8)
MCV RBC AUTO: 94.8 FL (ref 82.6–102.9)
NRBC BLD-RTO: 0 PER 100 WBC
PLATELET # BLD AUTO: 168 K/UL (ref 138–453)
PMV BLD AUTO: 10.2 FL (ref 8.1–13.5)
POTASSIUM SERPL-SCNC: 4.3 MMOL/L (ref 3.7–5.3)
PROT SERPL-MCNC: 7.3 G/DL (ref 6.6–8.7)
RBC # BLD AUTO: 4.8 M/UL (ref 3.95–5.11)
SODIUM SERPL-SCNC: 141 MMOL/L (ref 136–145)
TRIGL SERPL-MCNC: 113 MG/DL
TSH SERPL DL<=0.05 MIU/L-ACNC: 2.99 UIU/ML (ref 0.27–4.2)
URATE SERPL-MCNC: 5.2 MG/DL (ref 2.4–5.7)
VLDLC SERPL CALC-MCNC: 23 MG/DL
WBC OTHER # BLD: 2.9 K/UL (ref 3.5–11.3)

## 2024-01-29 PROCEDURE — 85027 COMPLETE CBC AUTOMATED: CPT

## 2024-01-29 PROCEDURE — 84550 ASSAY OF BLOOD/URIC ACID: CPT

## 2024-01-29 PROCEDURE — 36415 COLL VENOUS BLD VENIPUNCTURE: CPT

## 2024-01-29 PROCEDURE — 83036 HEMOGLOBIN GLYCOSYLATED A1C: CPT

## 2024-01-29 PROCEDURE — 80061 LIPID PANEL: CPT

## 2024-01-29 PROCEDURE — 84443 ASSAY THYROID STIM HORMONE: CPT

## 2024-01-29 PROCEDURE — 80053 COMPREHEN METABOLIC PANEL: CPT

## 2024-06-03 ENCOUNTER — HOSPITAL ENCOUNTER (OUTPATIENT)
Age: 69
Discharge: HOME | End: 2024-06-03
Payer: MEDICARE

## 2024-06-03 LAB
EST. AVERAGE GLUCOSE BLD GHB EST-MCNC: 123 MG/DL
HBA1C MFR BLD: 5.9 % (ref 4–6)

## 2024-06-03 PROCEDURE — 36415 COLL VENOUS BLD VENIPUNCTURE: CPT

## 2024-06-03 PROCEDURE — 83036 HEMOGLOBIN GLYCOSYLATED A1C: CPT

## 2024-12-09 ENCOUNTER — HOSPITAL ENCOUNTER (OUTPATIENT)
Dept: MAMMOGRAPHY | Age: 69
Discharge: HOME | End: 2024-12-11
Payer: MEDICARE

## 2024-12-09 VITALS — HEIGHT: 63 IN | BODY MASS INDEX: 30.12 KG/M2 | WEIGHT: 170 LBS

## 2024-12-09 DIAGNOSIS — Z12.31 ENCOUNTER FOR SCREENING MAMMOGRAM FOR MALIGNANT NEOPLASM OF BREAST: ICD-10-CM

## 2024-12-09 PROCEDURE — 77063 BREAST TOMOSYNTHESIS BI: CPT

## 2024-12-21 ENCOUNTER — HOSPITAL ENCOUNTER (OUTPATIENT)
Dept: GENERAL RADIOLOGY | Age: 69
Discharge: HOME | End: 2024-12-23
Payer: MEDICARE

## 2024-12-21 ENCOUNTER — HOSPITAL ENCOUNTER (OUTPATIENT)
Age: 69
Discharge: HOME | End: 2024-12-23
Payer: MEDICARE

## 2024-12-21 DIAGNOSIS — R10.814 LEFT LOWER QUADRANT ABDOMINAL TENDERNESS WITHOUT REBOUND TENDERNESS: ICD-10-CM

## 2024-12-21 PROCEDURE — 74018 RADEX ABDOMEN 1 VIEW: CPT

## 2025-01-17 ENCOUNTER — HOSPITAL ENCOUNTER (OUTPATIENT)
Age: 70
Discharge: HOME | End: 2025-01-19
Payer: MEDICARE

## 2025-01-17 ENCOUNTER — HOSPITAL ENCOUNTER (OUTPATIENT)
Dept: GENERAL RADIOLOGY | Age: 70
Discharge: HOME | End: 2025-01-19
Payer: MEDICARE

## 2025-01-17 ENCOUNTER — HOSPITAL ENCOUNTER (OUTPATIENT)
Age: 70
Setting detail: SPECIMEN
Discharge: HOME | End: 2025-01-17
Payer: MEDICARE

## 2025-01-17 DIAGNOSIS — M25.562 PAIN IN JOINT OF LEFT KNEE: ICD-10-CM

## 2025-01-17 LAB
ALBUMIN SERPL-MCNC: 4 G/DL (ref 3.5–5.2)
ALBUMIN/GLOB SERPL: 1.3 {RATIO} (ref 1–2.5)
ALP SERPL-CCNC: 77 U/L (ref 35–104)
ALT SERPL-CCNC: 12 U/L (ref 10–35)
ANION GAP SERPL CALCULATED.3IONS-SCNC: 8 MMOL/L (ref 9–16)
AST SERPL-CCNC: 24 U/L (ref 10–35)
BASOPHILS # BLD: 0 K/UL (ref 0–0.2)
BASOPHILS NFR BLD: 0 % (ref 0–2)
BILIRUB SERPL-MCNC: 0.6 MG/DL (ref 0–1.2)
BUN SERPL-MCNC: 17 MG/DL (ref 8–23)
CALCIUM SERPL-MCNC: 10.3 MG/DL (ref 8.6–10.4)
CHLORIDE SERPL-SCNC: 105 MMOL/L (ref 98–107)
CHOLEST SERPL-MCNC: 212 MG/DL (ref 0–199)
CHOLESTEROL/HDL RATIO: 4.4
CO2 SERPL-SCNC: 27 MMOL/L (ref 20–31)
CREAT SERPL-MCNC: 0.8 MG/DL (ref 0.6–0.9)
EOSINOPHIL # BLD: 0.07 K/UL (ref 0–0.4)
EOSINOPHILS RELATIVE PERCENT: 3 % (ref 1–4)
ERYTHROCYTE [DISTWIDTH] IN BLOOD BY AUTOMATED COUNT: 12.4 % (ref 11.8–14.4)
GFR, ESTIMATED: 80 ML/MIN/1.73M2
GLUCOSE SERPL-MCNC: 103 MG/DL (ref 74–99)
HCT VFR BLD AUTO: 43.7 % (ref 36.3–47.1)
HDLC SERPL-MCNC: 48 MG/DL
HGB BLD-MCNC: 14.4 G/DL (ref 11.9–15.1)
IMM GRANULOCYTES # BLD AUTO: 0 K/UL (ref 0–0.3)
IMM GRANULOCYTES NFR BLD: 0 %
LDLC SERPL CALC-MCNC: 145 MG/DL (ref 0–100)
LYMPHOCYTES NFR BLD: 1.4 K/UL (ref 1–4.8)
LYMPHOCYTES RELATIVE PERCENT: 58 % (ref 24–44)
MCH RBC QN AUTO: 30.3 PG (ref 25.2–33.5)
MCHC RBC AUTO-ENTMCNC: 33 G/DL (ref 28.4–34.8)
MCV RBC AUTO: 92 FL (ref 82.6–102.9)
MONOCYTES NFR BLD: 0.38 K/UL (ref 0.1–0.8)
MONOCYTES NFR BLD: 16 % (ref 1–7)
MORPHOLOGY: NORMAL
NEUTROPHILS NFR BLD: 23 % (ref 36–66)
NEUTS SEG NFR BLD: 0.55 K/UL (ref 1.8–7.7)
NRBC BLD-RTO: 0 PER 100 WBC
PLATELET # BLD AUTO: 178 K/UL (ref 138–453)
PMV BLD AUTO: 9.8 FL (ref 8.1–13.5)
POTASSIUM SERPL-SCNC: 4.3 MMOL/L (ref 3.7–5.3)
PROT SERPL-MCNC: 7.2 G/DL (ref 6.6–8.7)
RBC # BLD AUTO: 4.75 M/UL (ref 3.95–5.11)
SODIUM SERPL-SCNC: 140 MMOL/L (ref 136–145)
TRIGL SERPL-MCNC: 96 MG/DL
TSH SERPL DL<=0.05 MIU/L-ACNC: 2.99 UIU/ML (ref 0.27–4.2)
VLDLC SERPL CALC-MCNC: 19 MG/DL (ref 1–30)
WBC OTHER # BLD: 2.4 K/UL (ref 3.5–11.3)

## 2025-01-17 PROCEDURE — 80061 LIPID PANEL: CPT

## 2025-01-17 PROCEDURE — 85025 COMPLETE CBC W/AUTO DIFF WBC: CPT

## 2025-01-17 PROCEDURE — 73562 X-RAY EXAM OF KNEE 3: CPT

## 2025-01-17 PROCEDURE — 84443 ASSAY THYROID STIM HORMONE: CPT

## 2025-01-17 PROCEDURE — 80053 COMPREHEN METABOLIC PANEL: CPT

## 2025-01-17 PROCEDURE — 36415 COLL VENOUS BLD VENIPUNCTURE: CPT

## 2025-04-25 ENCOUNTER — HOSPITAL ENCOUNTER (OUTPATIENT)
Age: 70
Discharge: HOME | End: 2025-04-25
Payer: MEDICARE

## 2025-04-25 LAB
AMYLASE SERPL-CCNC: 59 U/L (ref 28–100)
IGA SERPL-MCNC: 245 MG/DL (ref 70–400)
LIPASE SERPL-CCNC: 45 U/L (ref 13–60)

## 2025-04-25 PROCEDURE — 82150 ASSAY OF AMYLASE: CPT

## 2025-04-25 PROCEDURE — 83516 IMMUNOASSAY NONANTIBODY: CPT

## 2025-04-25 PROCEDURE — 36415 COLL VENOUS BLD VENIPUNCTURE: CPT

## 2025-04-25 PROCEDURE — 82784 ASSAY IGA/IGD/IGG/IGM EACH: CPT

## 2025-04-25 PROCEDURE — 83690 ASSAY OF LIPASE: CPT

## 2025-04-28 LAB
TISSUE TRANSGLUTAMINASE ANTIBODY IGG: 1.6 U/ML
TTG IGA SER IA-ACNC: 0.7 U/ML

## 2025-05-01 ENCOUNTER — HOSPITAL ENCOUNTER (OUTPATIENT)
Age: 70
Setting detail: SPECIMEN
Discharge: HOME | End: 2025-05-01
Payer: MEDICARE

## 2025-05-01 PROCEDURE — 82705 FATS/LIPIDS FECES QUAL: CPT

## 2025-05-01 PROCEDURE — 87449 NOS EACH ORGANISM AG IA: CPT

## 2025-05-01 PROCEDURE — 82653 EL-1 FECAL QUANTITATIVE: CPT

## 2025-05-01 PROCEDURE — 83993 ASSAY FOR CALPROTECTIN FECAL: CPT

## 2025-05-01 PROCEDURE — 87324 CLOSTRIDIUM AG IA: CPT

## 2025-05-01 PROCEDURE — 87506 IADNA-DNA/RNA PROBE TQ 6-11: CPT

## 2025-05-02 LAB
C DIFF GDH + TOXINS A+B STL QL IA.RAPID: NEGATIVE
CAMPYLOBACTER DNA SPEC NAA+PROBE: NORMAL
ETEC ELTA+ESTB GENES STL QL NAA+PROBE: NORMAL
P SHIGELLOIDES DNA STL QL NAA+PROBE: NORMAL
SALMONELLA DNA SPEC QL NAA+PROBE: NORMAL
SHIGA TOXIN STX GENE SPEC NAA+PROBE: NORMAL
SHIGELLA DNA SPEC QL NAA+PROBE: NORMAL
SPECIMEN DESCRIPTION: NORMAL
SPECIMEN DESCRIPTION: NORMAL
V CHOL+PARA RFBL+TRKH+TNAA STL QL NAA+PR: NORMAL
Y ENTERO RECN STL QL NAA+PROBE: NORMAL

## 2025-05-03 LAB
FAT QUALITATIVE SPLIT STOOL: NORMAL
FECAL NEUTRAL FAT: NORMAL

## 2025-05-05 LAB — FECAL PANCREATIC ELASTASE-1: 82 UG/G

## 2025-05-06 LAB — CALPROTECTIN, FECAL: 7 UG/G

## 2025-06-30 ENCOUNTER — TELEPHONE (OUTPATIENT)
Dept: GASTROENTEROLOGY | Age: 70
End: 2025-06-30

## 2025-06-30 ENCOUNTER — HOSPITAL ENCOUNTER (OUTPATIENT)
Age: 70
Setting detail: SPECIMEN
Discharge: HOME | End: 2025-06-30
Payer: MEDICARE

## 2025-06-30 LAB
ALBUMIN SERPL-MCNC: 4.1 G/DL (ref 3.5–5.2)
ALBUMIN/GLOB SERPL: 1.3 {RATIO} (ref 1–2.5)
ALP SERPL-CCNC: 76 U/L (ref 35–104)
ALT SERPL-CCNC: 14 U/L (ref 10–35)
ANION GAP SERPL CALCULATED.3IONS-SCNC: 9 MMOL/L (ref 9–16)
AST SERPL-CCNC: 27 U/L (ref 10–35)
BASOPHILS # BLD: 0 K/UL (ref 0–0.2)
BASOPHILS NFR BLD: 0 % (ref 0–2)
BILIRUB SERPL-MCNC: 0.7 MG/DL (ref 0–1.2)
BUN SERPL-MCNC: 14 MG/DL (ref 8–23)
CALCIUM SERPL-MCNC: 10 MG/DL (ref 8.6–10.4)
CHLORIDE SERPL-SCNC: 106 MMOL/L (ref 98–107)
CHOLEST SERPL-MCNC: 193 MG/DL (ref 0–199)
CHOLESTEROL/HDL RATIO: 4.5
CO2 SERPL-SCNC: 26 MMOL/L (ref 20–31)
CREAT SERPL-MCNC: 0.8 MG/DL (ref 0.6–0.9)
EOSINOPHIL # BLD: 0.02 K/UL (ref 0–0.4)
EOSINOPHILS RELATIVE PERCENT: 1 % (ref 1–4)
ERYTHROCYTE [DISTWIDTH] IN BLOOD BY AUTOMATED COUNT: 12.5 % (ref 11.8–14.4)
GFR, ESTIMATED: 80 ML/MIN/1.73M2
GLUCOSE SERPL-MCNC: 95 MG/DL (ref 74–99)
HCT VFR BLD AUTO: 43.7 % (ref 36.3–47.1)
HDLC SERPL-MCNC: 43 MG/DL
HGB BLD-MCNC: 14.4 G/DL (ref 11.9–15.1)
IMM GRANULOCYTES # BLD AUTO: 0 K/UL (ref 0–0.3)
IMM GRANULOCYTES NFR BLD: 0 %
LDLC SERPL CALC-MCNC: 131 MG/DL (ref 0–100)
LYMPHOCYTES NFR BLD: 1.14 K/UL (ref 1–4.8)
LYMPHOCYTES RELATIVE PERCENT: 60 % (ref 24–44)
MCH RBC QN AUTO: 30.4 PG (ref 25.2–33.5)
MCHC RBC AUTO-ENTMCNC: 33 G/DL (ref 28.4–34.8)
MCV RBC AUTO: 92.4 FL (ref 82.6–102.9)
MONOCYTES NFR BLD: 0.34 K/UL (ref 0.1–0.8)
MONOCYTES NFR BLD: 18 % (ref 1–7)
MORPHOLOGY: NORMAL
NEUTROPHILS NFR BLD: 21 % (ref 36–66)
NEUTS SEG NFR BLD: 0.4 K/UL (ref 1.8–7.7)
NRBC BLD-RTO: 0 PER 100 WBC
PLATELET # BLD AUTO: 177 K/UL (ref 138–453)
PMV BLD AUTO: 10.2 FL (ref 8.1–13.5)
POTASSIUM SERPL-SCNC: 4.2 MMOL/L (ref 3.7–5.3)
PROT SERPL-MCNC: 7.2 G/DL (ref 6.6–8.7)
RBC # BLD AUTO: 4.73 M/UL (ref 3.95–5.11)
SODIUM SERPL-SCNC: 141 MMOL/L (ref 136–145)
TRIGL SERPL-MCNC: 94 MG/DL
VLDLC SERPL CALC-MCNC: 19 MG/DL (ref 1–30)
WBC OTHER # BLD: 1.9 K/UL (ref 3.5–11.3)

## 2025-06-30 PROCEDURE — 36415 COLL VENOUS BLD VENIPUNCTURE: CPT

## 2025-06-30 PROCEDURE — 85025 COMPLETE CBC W/AUTO DIFF WBC: CPT

## 2025-06-30 PROCEDURE — 80061 LIPID PANEL: CPT

## 2025-06-30 PROCEDURE — 80053 COMPREHEN METABOLIC PANEL: CPT

## 2025-06-30 NOTE — TELEPHONE ENCOUNTER
TBS for EGD/EUS/Hamdani  Dx:  LUQ pain/pancreatic insufficiency  Referring:  Dr. Avalos    Attempt 1 - writer [facility] for return call to schedule procedure  Attempt 2 - procedure letter mailed.

## 2025-07-01 NOTE — TELEPHONE ENCOUNTER
Patient called and asked to speak with Dr Fischer's . Writer did remind patient we are needing clearance prior to scheduling-patient stated she when she is scheduled she would like a morning.

## 2025-07-01 NOTE — TELEPHONE ENCOUNTER
Writer spoke with patient today.  Patient is taking Eliquis.   [facility]

## 2025-07-03 NOTE — TELEPHONE ENCOUNTER
Patient advised she had a CT scan done at [facility].  Writer schuyler at  radiology to send CT abdomen images to [facility].

## 2025-07-09 PROBLEM — R10.12 LEFT UPPER QUADRANT ABDOMINAL PAIN: Status: ACTIVE | Noted: 2025-07-09

## 2025-07-09 PROBLEM — K86.89 PANCREATIC INSUFFICIENCY: Status: ACTIVE | Noted: 2025-07-09

## 2025-07-23 ENCOUNTER — HOSPITAL ENCOUNTER (OUTPATIENT)
Age: 70
Setting detail: OUTPATIENT SURGERY
Discharge: HOME | End: 2025-07-23
Attending: INTERNAL MEDICINE | Admitting: INTERNAL MEDICINE
Payer: MEDICARE

## 2025-07-23 ENCOUNTER — ANESTHESIA EVENT (OUTPATIENT)
Dept: ENDOSCOPY | Age: 70
End: 2025-07-23
Payer: MEDICARE

## 2025-07-23 ENCOUNTER — ANESTHESIA (OUTPATIENT)
Dept: ENDOSCOPY | Age: 70
End: 2025-07-23
Payer: MEDICARE

## 2025-07-23 VITALS
RESPIRATION RATE: 15 BRPM | OXYGEN SATURATION: 100 % | DIASTOLIC BLOOD PRESSURE: 102 MMHG | BODY MASS INDEX: 46.07 KG/M2 | SYSTOLIC BLOOD PRESSURE: 157 MMHG | WEIGHT: 260 LBS | HEIGHT: 63 IN | TEMPERATURE: 96.8 F | HEART RATE: 76 BPM

## 2025-07-23 DIAGNOSIS — R10.12 LEFT UPPER QUADRANT ABDOMINAL PAIN: ICD-10-CM

## 2025-07-23 DIAGNOSIS — K86.89 PANCREATIC INSUFFICIENCY: ICD-10-CM

## 2025-07-23 LAB
ALP SERPL-CCNC: 76 U/L (ref 35–104)
ALT SERPL-CCNC: 17 U/L (ref 10–35)
POTASSIUM BLD-SCNC: 4 MMOL/L (ref 3.5–4.5)

## 2025-07-23 PROCEDURE — 3700000001 HC ADD 15 MINUTES (ANESTHESIA): Performed by: INTERNAL MEDICINE

## 2025-07-23 PROCEDURE — 6360000002 HC RX W HCPCS: Performed by: NURSE ANESTHETIST, CERTIFIED REGISTERED

## 2025-07-23 PROCEDURE — 84460 ALANINE AMINO (ALT) (SGPT): CPT

## 2025-07-23 PROCEDURE — 84132 ASSAY OF SERUM POTASSIUM: CPT

## 2025-07-23 PROCEDURE — 7100000011 HC PHASE II RECOVERY - ADDTL 15 MIN: Performed by: INTERNAL MEDICINE

## 2025-07-23 PROCEDURE — 3609018500 HC EGD US SCOPE W/ADJACENT STRUCTURES: Performed by: INTERNAL MEDICINE

## 2025-07-23 PROCEDURE — 84075 ASSAY ALKALINE PHOSPHATASE: CPT

## 2025-07-23 PROCEDURE — 2500000003 HC RX 250 WO HCPCS: Performed by: NURSE ANESTHETIST, CERTIFIED REGISTERED

## 2025-07-23 PROCEDURE — 3700000000 HC ANESTHESIA ATTENDED CARE: Performed by: INTERNAL MEDICINE

## 2025-07-23 PROCEDURE — 7100000010 HC PHASE II RECOVERY - FIRST 15 MIN: Performed by: INTERNAL MEDICINE

## 2025-07-23 PROCEDURE — 2709999900 HC NON-CHARGEABLE SUPPLY: Performed by: INTERNAL MEDICINE

## 2025-07-23 PROCEDURE — 88305 TISSUE EXAM BY PATHOLOGIST: CPT

## 2025-07-23 PROCEDURE — C1753 CATH, INTRAVAS ULTRASOUND: HCPCS | Performed by: INTERNAL MEDICINE

## 2025-07-23 PROCEDURE — 2580000003 HC RX 258: Performed by: NURSE ANESTHETIST, CERTIFIED REGISTERED

## 2025-07-23 PROCEDURE — 3609012400 HC EGD TRANSORAL BIOPSY SINGLE/MULTIPLE: Performed by: INTERNAL MEDICINE

## 2025-07-23 RX ORDER — PROPOFOL 10 MG/ML
INJECTION, EMULSION INTRAVENOUS
Status: DISCONTINUED | OUTPATIENT
Start: 2025-07-23 | End: 2025-07-23 | Stop reason: SDUPTHER

## 2025-07-23 RX ORDER — FENTANYL CITRATE 50 UG/ML
25 INJECTION, SOLUTION INTRAMUSCULAR; INTRAVENOUS EVERY 5 MIN PRN
Status: DISCONTINUED | OUTPATIENT
Start: 2025-07-23 | End: 2025-07-23 | Stop reason: HOSPADM

## 2025-07-23 RX ORDER — SODIUM CHLORIDE, SODIUM LACTATE, POTASSIUM CHLORIDE, CALCIUM CHLORIDE 600; 310; 30; 20 MG/100ML; MG/100ML; MG/100ML; MG/100ML
INJECTION, SOLUTION INTRAVENOUS
Status: DISCONTINUED | OUTPATIENT
Start: 2025-07-23 | End: 2025-07-23 | Stop reason: SDUPTHER

## 2025-07-23 RX ORDER — OXYCODONE HYDROCHLORIDE 5 MG/1
5 TABLET ORAL PRN
Status: DISCONTINUED | OUTPATIENT
Start: 2025-07-23 | End: 2025-07-23 | Stop reason: HOSPADM

## 2025-07-23 RX ORDER — DROPERIDOL 2.5 MG/ML
0.62 INJECTION, SOLUTION INTRAMUSCULAR; INTRAVENOUS
Status: DISCONTINUED | OUTPATIENT
Start: 2025-07-23 | End: 2025-07-23 | Stop reason: HOSPADM

## 2025-07-23 RX ORDER — PROCHLORPERAZINE EDISYLATE 5 MG/ML
5 INJECTION INTRAMUSCULAR; INTRAVENOUS
Status: DISCONTINUED | OUTPATIENT
Start: 2025-07-23 | End: 2025-07-23 | Stop reason: HOSPADM

## 2025-07-23 RX ORDER — LIDOCAINE HYDROCHLORIDE 10 MG/ML
INJECTION, SOLUTION EPIDURAL; INFILTRATION; INTRACAUDAL; PERINEURAL
Status: DISCONTINUED | OUTPATIENT
Start: 2025-07-23 | End: 2025-07-23 | Stop reason: SDUPTHER

## 2025-07-23 RX ORDER — LORAZEPAM 2 MG/ML
0.5 INJECTION INTRAMUSCULAR
Status: DISCONTINUED | OUTPATIENT
Start: 2025-07-23 | End: 2025-07-23 | Stop reason: HOSPADM

## 2025-07-23 RX ORDER — OXYCODONE HYDROCHLORIDE 5 MG/1
10 TABLET ORAL PRN
Status: DISCONTINUED | OUTPATIENT
Start: 2025-07-23 | End: 2025-07-23 | Stop reason: HOSPADM

## 2025-07-23 RX ORDER — SODIUM CHLORIDE 0.9 % (FLUSH) 0.9 %
5-40 SYRINGE (ML) INJECTION PRN
Status: DISCONTINUED | OUTPATIENT
Start: 2025-07-23 | End: 2025-07-23 | Stop reason: HOSPADM

## 2025-07-23 RX ORDER — DIPHENHYDRAMINE HYDROCHLORIDE 50 MG/ML
12.5 INJECTION, SOLUTION INTRAMUSCULAR; INTRAVENOUS
Status: DISCONTINUED | OUTPATIENT
Start: 2025-07-23 | End: 2025-07-23 | Stop reason: HOSPADM

## 2025-07-23 RX ORDER — SODIUM CHLORIDE 0.9 % (FLUSH) 0.9 %
5-40 SYRINGE (ML) INJECTION EVERY 12 HOURS SCHEDULED
Status: DISCONTINUED | OUTPATIENT
Start: 2025-07-23 | End: 2025-07-23 | Stop reason: HOSPADM

## 2025-07-23 RX ORDER — SODIUM CHLORIDE 9 MG/ML
INJECTION, SOLUTION INTRAVENOUS PRN
Status: DISCONTINUED | OUTPATIENT
Start: 2025-07-23 | End: 2025-07-23 | Stop reason: HOSPADM

## 2025-07-23 RX ADMIN — PROPOFOL 20 MG: 10 INJECTION, EMULSION INTRAVENOUS at 16:36

## 2025-07-23 RX ADMIN — PROPOFOL 20 MG: 10 INJECTION, EMULSION INTRAVENOUS at 16:38

## 2025-07-23 RX ADMIN — PROPOFOL 25 MG: 10 INJECTION, EMULSION INTRAVENOUS at 16:48

## 2025-07-23 RX ADMIN — Medication 5 MG: at 16:49

## 2025-07-23 RX ADMIN — PROPOFOL 20 MG: 10 INJECTION, EMULSION INTRAVENOUS at 16:52

## 2025-07-23 RX ADMIN — SODIUM CHLORIDE, POTASSIUM CHLORIDE, SODIUM LACTATE AND CALCIUM CHLORIDE: 600; 310; 30; 20 INJECTION, SOLUTION INTRAVENOUS at 16:29

## 2025-07-23 RX ADMIN — LIDOCAINE HYDROCHLORIDE 50 MG: 10 INJECTION, SOLUTION EPIDURAL; INFILTRATION; INTRACAUDAL; PERINEURAL at 16:36

## 2025-07-23 RX ADMIN — PROPOFOL 150 MCG/KG/MIN: 10 INJECTION, EMULSION INTRAVENOUS at 16:37

## 2025-07-23 RX ADMIN — Medication 20 MG: at 16:36

## 2025-07-23 RX ADMIN — PROPOFOL 25 MG: 10 INJECTION, EMULSION INTRAVENOUS at 16:49

## 2025-07-23 ASSESSMENT — PAIN - FUNCTIONAL ASSESSMENT
PAIN_FUNCTIONAL_ASSESSMENT: NONE - DENIES PAIN
PAIN_FUNCTIONAL_ASSESSMENT: NONE - DENIES PAIN

## 2025-07-23 ASSESSMENT — ENCOUNTER SYMPTOMS: SHORTNESS OF BREATH: 1

## 2025-07-23 NOTE — ANESTHESIA PRE PROCEDURE
Department of Anesthesiology  Preprocedure Note       Name:  Disha Cason   Age:  70 y.o.  :  1955                                          MRN:  [id number]         Date:  2025      Surgeon: Surgeon(s):  Zoran Fischer MD    Procedure: Procedure(s):  ENDOSCOPIC ULTRASOUND, EGD, PATHOLOGY    Medications prior to admission:   Prior to Admission medications    Medication Sig Start Date End Date Taking? Authorizing Provider   metoprolol tartrate (LOPRESSOR) 50 MG tablet 50 mg am/ 75mg @ hs 22  Yes Isidro Sommers MD   ELIQUIS 2.5 MG TABS tablet Take 2 tablets by mouth in the morning and at bedtime Pt. stopped 25 for OR 25. Mgmt. Cardiology 22  Yes Isidro Sommers MD   Coenzyme Q10 50 MG CAPS Take 50 mg by mouth Daily   Yes Isidro Sommers MD   calcium carbonate 600 MG TABS tablet Take 1 tablet by mouth 2 times daily   Yes Isidro Sommers MD   Multiple Vitamins-Minerals (MULTIVITAMIN ADULT PO) Take 1 tablet by mouth daily   Yes Isidro Sommers MD   vitamin E 1000 UNITS capsule Take 400 Units by mouth daily   Yes Isidro Sommers MD   Ascorbic Acid (VITAMIN C) 250 MG tablet Take 2 tablets by mouth daily   Yes Isidro Sommers MD   furosemide (LASIX) 40 MG tablet Take 1 tablet by mouth 2 times daily.  Patient taking differently: Take 0.5 tablets by mouth daily 13  Yes Leona Diallo PA-C   levothyroxine (SYNTHROID) 25 MCG tablet take 1 tablet by mouth once daily  Patient not taking: Reported on 2025   Isidro Sommers MD   allopurinol (ZYLOPRIM) 100 MG tablet take 1 tablet by mouth once daily  Patient not taking: Reported on 2025 3/5/21   Isidro Sommers MD   metoprolol succinate (TOPROL XL) 100 MG extended release tablet Take 1 tablet by mouth 2 times daily  Patient not taking: Reported on 2025    Isidro Sommers MD   atorvastatin (LIPITOR) 20 MG tablet Take 1 tablet by mouth daily  Patient

## 2025-07-23 NOTE — H&P
History and Physical    Pt Name: Disha Cason  MRN: [id number]  YOB: 1955  Date of evaluation: 7/23/2025  Primary Care Physician: Curtis Cason DO    SUBJECTIVE:   History of Chief Complaint:    Disha Cason is a 70 y.o. female who is scheduled today for ENDOSCOPIC ULTRASOUND, EGD, PATHOLOGY. Patient reports she has been having left upper quadrant pain, ongoing for weeks. She denies any nausea, vomiting, reflux or dysphagia.   Allergies  is allergic to norvasc [amlodipine], lisinopril, sulfa antibiotics, and sulfasalazine.  Medications  Prior to Admission medications    Medication Sig Start Date End Date Taking? Authorizing Provider   metoprolol tartrate (LOPRESSOR) 50 MG tablet 50 mg am/ 75mg @ hs 4/9/22  Yes Isidro Sommers MD   ELIQUIS 2.5 MG TABS tablet Take 2 tablets by mouth in the morning and at bedtime Pt. stopped 7-20-25 for OR 7-23-25. Mgmt. Cardiology 2/28/22  Yes Isidro Sommers MD   Coenzyme Q10 50 MG CAPS Take 50 mg by mouth Daily   Yes Isidro Sommers MD   calcium carbonate 600 MG TABS tablet Take 1 tablet by mouth 2 times daily   Yes Isidro Sommers MD   Multiple Vitamins-Minerals (MULTIVITAMIN ADULT PO) Take 1 tablet by mouth daily   Yes Isidro Sommers MD   vitamin E 1000 UNITS capsule Take 400 Units by mouth daily   Yes Isidro Sommers MD   Ascorbic Acid (VITAMIN C) 250 MG tablet Take 2 tablets by mouth daily   Yes Isidro Sommers MD   furosemide (LASIX) 40 MG tablet Take 1 tablet by mouth 2 times daily.  Patient taking differently: Take 0.5 tablets by mouth daily 12/13/13  Yes Leona Diallo PA-C   levothyroxine (SYNTHROID) 25 MCG tablet take 1 tablet by mouth once daily  Patient not taking: Reported on 7/21/2025 2/26/22   Isidro Sommers MD   allopurinol (ZYLOPRIM) 100 MG tablet take 1 tablet by mouth once daily  Patient not taking: Reported on 7/21/2025 3/5/21   Isidro Sommers MD   metoprolol succinate (TOPROL XL)

## 2025-07-23 NOTE — OP NOTE
Operative Note      Patient: Disha Cason  YOB: 1955  MRN: [id number]    Date of Procedure: 7/23/2025    Pre-Op Diagnosis Codes:      * Left upper quadrant abdominal pain [R10.12]     * Pancreatic insufficiency [K86.89]    Post-Op Diagnosis: Gastritis, moderate chronic pancreatitis limited to the head/uncinate process of the pancreas, cholelithiasis, gallbladder polyp.       Procedure(s):  ENDOSCOPIC ULTRASOUND  ESOPHAGOGASTRODUODENOSCOPY BIOPSY    Surgeon(s):  Zoran Fischer MD    Assistant:   * No surgical staff found *    Anesthesia: Monitor Anesthesia Care    Estimated Blood Loss (mL): Minimal    Complications: None    Specimens:   ID Type Source Tests Collected by Time Destination   A : Gastric bx Tissue Gastric SURGICAL PATHOLOGY Zoran Fischer MD 7/23/2025 1639        Implants:  * No implants in log *      Drains: * No LDAs found *    Findings:  Present At Time Of Surgery (PATOS) (choose all levels that have infection present):  No infection present        Description of Procedure:  Informed consent was obtained from the patient after explanation of the procedure including indications, description of the procedure,  benefits and possible risks and complications of the procedure, and alternatives. Questions were answered.  The patient's history was reviewed and a directed physical examination was performed prior to the procedure.    Patient was monitored throughout the procedure with pulse oximetry and periodic assessment of vital signs. Patient was sedated as noted above. With the patient in the left lateral decubitus position, the Olympus videoendoscope followed by endoechoendoscope was placed in the patient's mouth and under direct visualization passed into the esophagus. The scope was passed to the 2nd portion of the duodenum.  The patient tolerated the procedure well and was taken to the recovery area in good condition.    EGD Findings::   Esophagus: normal.   Stomach:

## 2025-07-23 NOTE — DISCHARGE INSTRUCTIONS
[facility]    POST-ENDOSCOPY INSTRUCTIONS    1. ACTIVITY   No driving, operating machinery, or making important decisions for 24 hours.    Resume normal activity after 24 hours.  You may return to work after 24 hours.    2. DIET        Low-fat diet    3. MEDICATIONS    Resume your usual medications.     Start Eliquis from tomorrow.    Do not consume alcohol, tranquilizers, or sleeping medications for 24 hour unless advised by your physician.                 4. PHYSICIAN FOLLOW-UP / INSTRUCTIONS    Please call the office/clinic in 10 days for biopsy results:      [  ] GI office:  [phone] option #2          Follow up with your family physician as planned.    6. NORMAL CHANGES YOU MAY EXPERIENCE AFTER ENDOSCOPY:         EGD/EUS          Sore throat after EGD/EUS       A bloated feeling and belching from       air in stomach               You may feel fatigued for the next 24-48    hours due to the prep and sedation    7. CALL YOUR PHYSICIAN IF YOU EXPERIENCE ANY OF THE FOLLOWING      A.  Passing blood rectally or vomiting blood (color may be red or black)      B.  Severe abdominal pain or tenderness (that is not relieved by passing air)      C.  Fever, chills, or excessive sweating      D.  Persistent nausea or vomiting      E.  Redness or swelling at the IV site    If you have additional questions, PLEASE call your doctor or the [facility] GI Unit ([phone] option #2)

## 2025-07-24 NOTE — ANESTHESIA POSTPROCEDURE EVALUATION
Department of Anesthesiology  Postprocedure Note    Patient: Disha Cason  MRN: [id number]  YOB: 1955  Date of evaluation: 7/24/2025    Procedure Summary       Date: 07/23/25 Room / Location: [place] [id number] / [facility]    Anesthesia Start: 1628 Anesthesia Stop: 1710    Procedures:       ENDOSCOPIC ULTRASOUND      ESOPHAGOGASTRODUODENOSCOPY BIOPSY Diagnosis:       Left upper quadrant abdominal pain      Pancreatic insufficiency      (Left upper quadrant abdominal pain [R10.12])      (Pancreatic insufficiency [K86.89])    Surgeons: Zoran Fischer MD Responsible Provider: Terrence Rivera MD    Anesthesia Type: MAC ASA Status: 3            Anesthesia Type: No value filed.    Meeta Phase I: Meeta Score: 10    Meeta Phase II: Meeta Score: 10    Anesthesia Post Evaluation    Patient location during evaluation: PACU  Patient participation: complete - patient participated  Level of consciousness: awake and alert  Airway patency: patent  Nausea & Vomiting: no nausea and no vomiting  Cardiovascular status: blood pressure returned to baseline  Respiratory status: acceptable  Hydration status: euvolemic  Pain management: adequate    No notable events documented.

## 2025-07-26 LAB — SURGICAL PATHOLOGY REPORT: NORMAL

## 2025-07-28 ENCOUNTER — TELEPHONE (OUTPATIENT)
Dept: GASTROENTEROLOGY | Age: 70
End: 2025-07-28

## 2025-07-28 NOTE — TELEPHONE ENCOUNTER
----- Message from Dr. Zoran Fischer MD sent at 7/28/2025 11:12 AM EDT -----  Biopsies done from your stomach at the time of your endoscopy showed chronic inflammation with no evidence of infection.  You should follow with Dr. Avalos and PCP for further workup    Patient requesting that OP Note and Patho be faxed to her PCP office Dr Rosey Ames Office at [phone]. Writer noted will fax over. Patient thanked writer. Please advise.

## 2025-07-30 ENCOUNTER — TRANSCRIBE ORDERS (OUTPATIENT)
Dept: ADMINISTRATIVE | Age: 70
End: 2025-07-30

## 2025-07-30 DIAGNOSIS — R10.84 ABDOMINAL PAIN, GENERALIZED: Primary | ICD-10-CM

## 2025-08-06 ENCOUNTER — OFFICE VISIT (OUTPATIENT)
Age: 70
End: 2025-08-06
Payer: MEDICARE

## 2025-08-06 ENCOUNTER — HOSPITAL ENCOUNTER (OUTPATIENT)
Dept: ULTRASOUND IMAGING | Age: 70
Discharge: HOME | End: 2025-08-08
Payer: MEDICARE

## 2025-08-06 VITALS
TEMPERATURE: 98.3 F | DIASTOLIC BLOOD PRESSURE: 80 MMHG | SYSTOLIC BLOOD PRESSURE: 118 MMHG | HEART RATE: 96 BPM | BODY MASS INDEX: 45.88 KG/M2 | WEIGHT: 259 LBS

## 2025-08-06 DIAGNOSIS — K80.20 CALCULUS OF GALLBLADDER WITHOUT CHOLECYSTITIS WITHOUT OBSTRUCTION: Primary | ICD-10-CM

## 2025-08-06 DIAGNOSIS — K80.20 CALCULUS OF GALLBLADDER WITHOUT CHOLECYSTITIS WITHOUT OBSTRUCTION: ICD-10-CM

## 2025-08-06 PROCEDURE — 1123F ACP DISCUSS/DSCN MKR DOCD: CPT

## 2025-08-06 PROCEDURE — 1159F MED LIST DOCD IN RCRD: CPT

## 2025-08-06 PROCEDURE — 3079F DIAST BP 80-89 MM HG: CPT

## 2025-08-06 PROCEDURE — 3074F SYST BP LT 130 MM HG: CPT

## 2025-08-06 PROCEDURE — 1126F AMNT PAIN NOTED NONE PRSNT: CPT

## 2025-08-06 PROCEDURE — 99204 OFFICE O/P NEW MOD 45 MIN: CPT

## 2025-08-06 PROCEDURE — 76705 ECHO EXAM OF ABDOMEN: CPT

## 2025-08-07 ENCOUNTER — HOSPITAL ENCOUNTER (OUTPATIENT)
Dept: NUCLEAR MEDICINE | Age: 70
Discharge: HOME | End: 2025-08-09
Payer: MEDICARE

## 2025-08-07 DIAGNOSIS — R10.84 ABDOMINAL PAIN, GENERALIZED: ICD-10-CM

## 2025-08-07 PROCEDURE — A9537 TC99M MEBROFENIN: HCPCS | Performed by: PHYSICIAN ASSISTANT

## 2025-08-07 PROCEDURE — 78227 HEPATOBIL SYST IMAGE W/DRUG: CPT

## 2025-08-07 PROCEDURE — 6360000002 HC RX W HCPCS: Performed by: PHYSICIAN ASSISTANT

## 2025-08-07 PROCEDURE — 2580000003 HC RX 258: Performed by: PHYSICIAN ASSISTANT

## 2025-08-07 PROCEDURE — 3430000000 HC RX DIAGNOSTIC RADIOPHARMACEUTICAL: Performed by: PHYSICIAN ASSISTANT

## 2025-08-07 RX ORDER — SINCALIDE 5 UG/5ML
0.02 INJECTION, POWDER, LYOPHILIZED, FOR SOLUTION INTRAVENOUS ONCE
Status: COMPLETED | OUTPATIENT
Start: 2025-08-07 | End: 2025-08-07

## 2025-08-07 RX ORDER — SODIUM CHLORIDE 9 MG/ML
INJECTION, SOLUTION INTRAVENOUS ONCE
Status: COMPLETED | OUTPATIENT
Start: 2025-08-07 | End: 2025-08-07

## 2025-08-07 RX ADMIN — SODIUM CHLORIDE: 9 INJECTION, SOLUTION INTRAVENOUS at 11:05

## 2025-08-07 RX ADMIN — SINCALIDE 2.35 MCG: 5 INJECTION, POWDER, LYOPHILIZED, FOR SOLUTION INTRAVENOUS at 11:05

## 2025-08-07 RX ADMIN — Medication 3.8 MILLICURIE: at 09:55

## 2025-08-13 ENCOUNTER — OFFICE VISIT (OUTPATIENT)
Age: 70
End: 2025-08-13
Payer: MEDICARE

## 2025-08-13 VITALS
HEIGHT: 63 IN | SYSTOLIC BLOOD PRESSURE: 115 MMHG | BODY MASS INDEX: 49.33 KG/M2 | WEIGHT: 278.4 LBS | HEART RATE: 100 BPM | DIASTOLIC BLOOD PRESSURE: 82 MMHG

## 2025-08-13 DIAGNOSIS — K80.50 RECURRENT BILIARY COLIC: Primary | ICD-10-CM

## 2025-08-13 PROCEDURE — 3079F DIAST BP 80-89 MM HG: CPT

## 2025-08-13 PROCEDURE — 1123F ACP DISCUSS/DSCN MKR DOCD: CPT

## 2025-08-13 PROCEDURE — 3074F SYST BP LT 130 MM HG: CPT

## 2025-08-13 PROCEDURE — 1159F MED LIST DOCD IN RCRD: CPT

## 2025-08-13 PROCEDURE — 99213 OFFICE O/P EST LOW 20 MIN: CPT

## 2025-08-13 PROCEDURE — 1126F AMNT PAIN NOTED NONE PRSNT: CPT

## (undated) DEVICE — SINGLE-USE BIOPSY FORCEPS: Brand: RADIAL JAW 4

## (undated) DEVICE — SVMMC NSL PK

## (undated) DEVICE — CORD,CAUTERY,BIPOLAR,STERILE: Brand: MEDLINE

## (undated) DEVICE — SPLINT 1524050 5PK PAIR DOYLE II AIRWAY: Brand: DOYLE II ™

## (undated) DEVICE — SUTURE CHROMIC GUT SZ 5-0 L18IN ABSRB BRN L16MM PS-3 3/8 1636G

## (undated) DEVICE — Device

## (undated) DEVICE — Device: Brand: BALLOON3

## (undated) DEVICE — DUAL LUMEN STOMACH TUBE: Brand: SALEM SUMP